# Patient Record
Sex: FEMALE | Race: WHITE | Employment: OTHER | URBAN - METROPOLITAN AREA
[De-identification: names, ages, dates, MRNs, and addresses within clinical notes are randomized per-mention and may not be internally consistent; named-entity substitution may affect disease eponyms.]

---

## 2017-12-19 ENCOUNTER — HOSPITAL ENCOUNTER (OUTPATIENT)
Dept: LAB | Age: 77
Discharge: HOME | End: 2017-12-19
Payer: MEDICARE

## 2017-12-19 LAB
APPEARANCE FLD: NORMAL
APPEARANCE FLD: NORMAL
BASOPHILS # BLD: 0 K/UL (ref 0–0.2)
BASOPHILS NFR BLD: 0 % (ref 0–2)
COLOR FLD: NORMAL
COLOR FLD: YELLOW
CRP SERPL-MCNC: 1.8 MG/DL (ref 0–0.9)
DIFFERENTIAL METHOD BLD: ABNORMAL
EOSINOPHIL # BLD: 0.2 K/UL (ref 0–0.8)
EOSINOPHIL NFR BLD: 2 % (ref 0.5–7.8)
ERYTHROCYTE [DISTWIDTH] IN BLOOD BY AUTOMATED COUNT: 13.1 % (ref 11.9–14.6)
ERYTHROCYTE [SEDIMENTATION RATE] IN BLOOD: 26 MM/HR (ref 0–30)
HCT VFR BLD AUTO: 45.6 % (ref 35.8–46.3)
HGB BLD-MCNC: 14.6 G/DL (ref 11.7–15.4)
IMM GRANULOCYTES # BLD: 0 K/UL (ref 0–0.5)
IMM GRANULOCYTES NFR BLD AUTO: 0 % (ref 0–5)
LYMPHOCYTES # BLD: 1.8 K/UL (ref 0.5–4.6)
LYMPHOCYTES NFR BLD: 21 % (ref 13–44)
LYMPHOCYTES NFR FLD: 21 %
LYMPHOCYTES NFR FLD: 4 %
MCH RBC QN AUTO: 29.9 PG (ref 26.1–32.9)
MCHC RBC AUTO-ENTMCNC: 32 G/DL (ref 31.4–35)
MCV RBC AUTO: 93.3 FL (ref 79.6–97.8)
MONOCYTES # BLD: 0.9 K/UL (ref 0.1–1.3)
MONOCYTES NFR BLD: 10 % (ref 4–12)
MONOS+MACROS NFR FLD: 2 %
MONOS+MACROS NFR FLD: 8 %
NEUTROPHILS NFR FLD: 71 %
NEUTROPHILS NFR FLD: 94 %
NEUTS SEG # BLD: 5.6 K/UL (ref 1.7–8.2)
NEUTS SEG NFR BLD: 67 % (ref 43–78)
NUC CELL # FLD: 1598 /CU MM
NUC CELL # FLD: 8523 /CU MM
PLATELET # BLD AUTO: 327 K/UL (ref 150–450)
PMV BLD AUTO: 9.8 FL (ref 10.8–14.1)
RBC # BLD AUTO: 4.89 M/UL (ref 4.05–5.25)
RBC # FLD: NORMAL /CU MM
RBC # FLD: NORMAL /CU MM
SPECIMEN SOURCE FLD: NORMAL
SPECIMEN SOURCE FLD: NORMAL
URATE SERPL-MCNC: 6.2 MG/DL (ref 2.6–6)
WBC # BLD AUTO: 8.6 K/UL (ref 4.3–11.1)

## 2017-12-19 PROCEDURE — 85652 RBC SED RATE AUTOMATED: CPT | Performed by: ORTHOPAEDIC SURGERY

## 2017-12-19 PROCEDURE — 84550 ASSAY OF BLOOD/URIC ACID: CPT | Performed by: ORTHOPAEDIC SURGERY

## 2017-12-19 PROCEDURE — 85025 COMPLETE CBC W/AUTO DIFF WBC: CPT | Performed by: ORTHOPAEDIC SURGERY

## 2017-12-19 PROCEDURE — 36415 COLL VENOUS BLD VENIPUNCTURE: CPT | Performed by: ORTHOPAEDIC SURGERY

## 2017-12-19 PROCEDURE — 87205 SMEAR GRAM STAIN: CPT | Performed by: ORTHOPAEDIC SURGERY

## 2017-12-19 PROCEDURE — 87075 CULTR BACTERIA EXCEPT BLOOD: CPT | Performed by: ORTHOPAEDIC SURGERY

## 2017-12-19 PROCEDURE — 89060 EXAM SYNOVIAL FLUID CRYSTALS: CPT | Performed by: ORTHOPAEDIC SURGERY

## 2017-12-19 PROCEDURE — 86140 C-REACTIVE PROTEIN: CPT | Performed by: ORTHOPAEDIC SURGERY

## 2017-12-19 PROCEDURE — 89050 BODY FLUID CELL COUNT: CPT | Performed by: ORTHOPAEDIC SURGERY

## 2017-12-21 LAB
BACTERIA SPEC CULT: NORMAL
BACTERIA SPEC CULT: NORMAL
BODY FLD TYPE: NORMAL
BODY FLD TYPE: NORMAL
CRYSTALS FLD MICRO: NORMAL
CRYSTALS FLD MICRO: NORMAL
GRAM STN SPEC: NORMAL
SERVICE CMNT-IMP: NORMAL
SERVICE CMNT-IMP: NORMAL

## 2017-12-26 LAB
BACTERIA SPEC CULT: NORMAL
BACTERIA SPEC CULT: NORMAL
SERVICE CMNT-IMP: NORMAL
SERVICE CMNT-IMP: NORMAL

## 2018-01-16 ENCOUNTER — HOSPITAL ENCOUNTER (OUTPATIENT)
Dept: LAB | Age: 78
Discharge: HOME | End: 2018-01-16
Payer: MEDICARE

## 2018-01-16 LAB
APPEARANCE FLD: NORMAL
APPEARANCE FLD: NORMAL
BASOPHILS NFR FLD: 2 %
COLOR FLD: NORMAL
COLOR FLD: NORMAL
EOSINOPHIL NFR FLD MANUAL: 1 %
FLUID COMMENTS, FCOM: NORMAL
LYMPHOCYTES NFR FLD: 21 %
LYMPHOCYTES NFR FLD: 23 %
NEUTROPHILS NFR FLD: 76 %
NEUTROPHILS NFR FLD: 77 %
NUC CELL # FLD: 1776 /CU MM
NUC CELL # FLD: NORMAL /CU MM
RBC # FLD: NORMAL /CU MM
RBC # FLD: NORMAL /CU MM
SPECIMEN SOURCE FLD: NORMAL
SPECIMEN SOURCE FLD: NORMAL

## 2018-01-16 PROCEDURE — 87205 SMEAR GRAM STAIN: CPT | Performed by: ORTHOPAEDIC SURGERY

## 2018-01-16 PROCEDURE — 87075 CULTR BACTERIA EXCEPT BLOOD: CPT | Performed by: ORTHOPAEDIC SURGERY

## 2018-01-16 PROCEDURE — 89050 BODY FLUID CELL COUNT: CPT | Performed by: ORTHOPAEDIC SURGERY

## 2018-01-16 PROCEDURE — 89060 EXAM SYNOVIAL FLUID CRYSTALS: CPT | Performed by: ORTHOPAEDIC SURGERY

## 2018-03-06 NOTE — H&P
[address] [facility]  Pre Operative History and Physical Exam    Patient ID:  Debbie Trent  [id number]  83 y.o.  1940    Today: March 6, 2018       Assessment:   1. Painful left knee        Plan:    1. Proceed with scheduled Procedure(s) (LRB):  LEFT KNEE ARTHROPLASTY TOTAL REVISION/DEPUY (Left)            CC: Left knee pain    HPI:   The patient has a painful left TKA. The patient was evaluated and examined during a consultation prior to this office visit. There have been no changes to the patient's orthopedic condition since the initial consultation. The necessity for joint replacement revision  is present. The patient will be admitted the day of surgery for Procedure(s) (LRB):  LEFT KNEE ARTHROPLASTY TOTAL REVISION/DEPUY (Left)      Past Medical/Surgical History:  Past Medical History:   Diagnosis Date    Arthritis     knee    Hypertension     controlled    Nausea & vomiting     Psychiatric disorder     depression, anxiety    Thyroid disease     hypo     Past Surgical History:   Procedure Laterality Date    HX MOHS PROCEDURES      right    THYROIDECTOMY          Allergies: Allergies   Allergen Reactions    Codeine Nausea and Vomiting        Physical Exam:   General: NAD, Alert, Oriented, Appears their stated age     [de-identified]: NC/AT, PERRL    Skin: No rashes, lesions or wounds seen      Psych: normal affect      Heart: Regular Rate, Rhythm     Lungs: unlabored respirations, normal breath sounds     Abdomen: Soft and non-distended     Ortho: Pain with limited ROM of the left knee    Neuro: no focal defects, sensation is equal bilaterally     Lymph: no lymphadenopathy     Meds:   No current facility-administered medications for this encounter. Current Outpatient Prescriptions   Medication Sig    warfarin (COUMADIN) 5 mg tablet Take 1 Tab by mouth nightly.  traMADol (ULTRAM) 50 mg tablet Take 1 Tab by mouth every six (6) hours as needed.     LORazepam (ATIVAN) 0.5 mg tablet Take 1 Tab by mouth every eight (8) hours as needed for Anxiety.  celecoxib (CELEBREX) 200 mg capsule Take 200 mg by mouth daily.  levothyroxine (SYNTHROID) 100 mcg tablet Take 100 mcg by mouth Daily (before breakfast).  lisinopril (PRINIVIL, ZESTRIL) 10 mg tablet Take 10 mg by mouth daily.  montelukast (SINGULAIR) 10 mg tablet Take 10 mg by mouth daily.  LORazepam (ATIVAN) 0.5 mg tablet Take 0.5 mg by mouth every four (4) hours as needed.  mometasone (NASONEX) 50 mcg/actuation nasal spray 2 Sprays daily. Labs:  Hospital Outpatient Visit on 01/16/2018   Component Date Value Ref Range Status    Special Requests: 01/16/2018     Final                    Value:LEFT  MEDIAL  SWAB      GRAM STAIN 01/16/2018 NO DEFINITE ORGANISM SEEN    Final    GRAM STAIN 01/16/2018 NO WBCS SEEN    Final    Culture result: 01/16/2018 NO GROWTH 2 DAYS    Final    Special Requests: 01/16/2018     Final                    Value:LEFT  MEDIAL  SWAB      Culture result: 01/16/2018 NO ANAEROBES ISOLATED 7 DAYS    Final    BODY FLUID TYPE 01/16/2018 KNEE FLUID    Final    Comment: LEFT  MEDIAL      FLUID COLOR 01/16/2018 BARBIE    Final    FLUID APPEARANCE 01/16/2018 TURBID    Final    FLUID RBC CT. 01/16/2018 62560  /cu mm Final    FLUID WBC COUNT 01/16/2018 1776  /cu mm Final    FLD NEUTROPHILS 01/16/2018 77  % Final    FLD LYMPHS 01/16/2018 21  % Final    FLD BASOS 01/16/2018 2  % Final    FLUID COMMENT 01/16/2018 FEW LARGE UNIDENTIFIED MONONUCLEAR CELLS SEEN   Final    FLUID TYPE(7) 01/16/2018 KNEE FLUID    Final    Comment: LEFT  MEDIAL      Crystals, body fluid 01/16/2018 (NOTE)   Final    Comment: SIGNIFICANT NUMBERS OF NEGATIVELY BIFRINGENT NEEDLE SHAPED CRYSTALS   ARE NOT IDENTIFIED UNDER POLARIZED LIGHT    PER DR. Bert Sheffield      Special Requests: 01/16/2018     Final                    Value:LEFT  JOINT  SWAB      GRAM STAIN 01/16/2018 NO DEFINITE ORGANISM SEEN    Final    GRAM STAIN 01/16/2018 0 TO 5 WBC'S PER OIF   Final    Culture result: 01/16/2018 NO GROWTH 2 DAYS    Final    Special Requests: 01/16/2018     Final                    Value:LEFT  JOINT  SWAB      Culture result: 01/16/2018 NO ANAEROBES ISOLATED 7 DAYS    Final    BODY FLUID TYPE 01/16/2018 KNEE FLUID    Final    Comment: LEFT  JOINT      FLUID COLOR 01/16/2018 RED    Final    FLUID APPEARANCE 01/16/2018 TURBID    Final    FLUID RBC CT. 01/16/2018 (NOTE)  /cu mm Final    QNS TO QUANTIFY. DIFFERENTIAL WILL BE PERFORMED.  FLUID WBC COUNT 01/16/2018 (NOTE)  /cu mm Final    QNS TO QUANTIFY. DIFFERENTIAL WILL BE PERFORMED.  FLD NEUTROPHILS 01/16/2018 76  % Final    FLD LYMPHS 01/16/2018 23  % Final    FLD EOSINS 01/16/2018 1  % Final    FLUID TYPE(7) 01/16/2018 KNEE FLUID    Final    Comment: LEFT  JOINT      Crystals, body fluid 01/16/2018 (NOTE)   Final    Comment: SIGNIFICANT NUMBER OF NEGATIVELY BIFRINGENT  NEEDLE SHAPED CRYSTALS   ARE NOT IDENTIFIED UNDER POLARIZED LIGHT     PER   [facility] Outpatient Visit on 12/19/2017   Component Date Value Ref Range Status    Special Requests: 12/19/2017     Final                    Value:LEFT  JOINT FLUID      GRAM STAIN 12/19/2017 0 TO 25 WBC'S/OIF   Final    GRAM STAIN 12/19/2017 NO DEFINITE ORGANISM SEEN    Final    Culture result: 12/19/2017 NO GROWTH 2 DAYS    Final    Special Requests: 12/19/2017     Final                    Value:LEFT  JOINT FLUID      Culture result: 12/19/2017 NO ANAEROBES ISOLATED 7 DAYS    Final    BODY FLUID TYPE 12/19/2017 KNEE    Final    Comment: LEFT  JOINT FLUID      FLUID COLOR 12/19/2017 RED    Final    FLUID APPEARANCE 12/19/2017 TURBID    Final    FLUID RBC CT. 12/19/2017 31400  /cu mm Final    FLUID WBC COUNT 12/19/2017 8523  /cu mm Final    FLD NEUTROPHILS 12/19/2017 94  % Final    FLD LYMPHS 12/19/2017 4  % Final    FLD MONO/MACROPHAGES 12/19/2017 2  % Final    FLUID TYPE(7) 12/19/2017 KNEE    Final Comment: LEFT  JOINT FLUID      Crystals, body fluid 12/19/2017 (NOTE)   Final    Comment: SIGNIFICANT NUMBERS OF NEGATIVELY BIREFRINGENT NEEDLE SHAPED CRYSTALS   ARE NOT IDENTIFIED UNDER POLARIZED LIGHT. DR Kaleigh Nina        Special Requests: 12/19/2017     Final                    Value:MEDIAL  LEFT     France Ruiz STAIN 12/19/2017 0 TO 6  WBC'S/OIF   Final    GRAM STAIN 12/19/2017 NO DEFINITE ORGANISM SEEN    Final    Culture result: 12/19/2017 NO GROWTH 2 DAYS    Final    Special Requests: 12/19/2017     Final                    Value:MEDIAL  LEFT      Culture result: 12/19/2017 NO ANAEROBES ISOLATED 7 DAYS    Final    BODY FLUID TYPE 12/19/2017 KNEE    Final    Comment: MEDIAL  LEFT      FLUID COLOR 12/19/2017 YELLOW    Final    FLUID APPEARANCE 12/19/2017 CLOUDY    Final    FLUID RBC CT. 12/19/2017 53533  /cu mm Final    FLUID WBC COUNT 12/19/2017 1598  /cu mm Final    FLD NEUTROPHILS 12/19/2017 71  % Final    FLD LYMPHS 12/19/2017 21  % Final    FLD MONO/MACROPHAGES 12/19/2017 8  % Final    FLUID TYPE(7) 12/19/2017 KNEE    Final    Comment: MEDIAL  LEFT      Crystals, body fluid 12/19/2017 (NOTE)   Final    Comment: BLOODY, SIGNIFICANT NUMBERS OF NEGATIVELY BIREFRIGENT NEEDLE-SHAPED   CRYSTALS ARE NOT IDENTIFIED UNDER POLARIZED LIGHT.     [address] Outpatient Visit on 12/19/2017   Component Date Value Ref Range Status    WBC 12/19/2017 8.6  4.3 - 11.1 K/uL Final    RBC 12/19/2017 4.89  4.05 - 5.25 M/uL Final    HGB 12/19/2017 14.6  11.7 - 15.4 g/dL Final    HCT 12/19/2017 45.6  35.8 - 46.3 % Final    MCV 12/19/2017 93.3  79.6 - 97.8 FL Final    MCH 12/19/2017 29.9  26.1 - 32.9 PG Final    MCHC 12/19/2017 32.0  31.4 - 35.0 g/dL Final    RDW 12/19/2017 13.1  11.9 - 14.6 % Final    PLATELET 16/69/3851 025  150 - 450 K/uL Final    MPV 12/19/2017 9.8* 10.8 - 14.1 FL Final    DF 12/19/2017 AUTOMATED    Final    NEUTROPHILS 12/19/2017 67  43 - 78 % Final    LYMPHOCYTES 12/19/2017 21  13 - 44 % Final    MONOCYTES 12/19/2017 10  4.0 - 12.0 % Final    EOSINOPHILS 12/19/2017 2  0.5 - 7.8 % Final    BASOPHILS 12/19/2017 0  0.0 - 2.0 % Final    IMMATURE GRANULOCYTES 12/19/2017 0  0.0 - 5.0 % Final    ABS. NEUTROPHILS 12/19/2017 5.6  1.7 - 8.2 K/UL Final    ABS. LYMPHOCYTES 12/19/2017 1.8  0.5 - 4.6 K/UL Final    ABS. MONOCYTES 12/19/2017 0.9  0.1 - 1.3 K/UL Final    ABS. EOSINOPHILS 12/19/2017 0.2  0.0 - 0.8 K/UL Final    ABS. BASOPHILS 12/19/2017 0.0  0.0 - 0.2 K/UL Final    ABS. IMM. GRANS.  12/19/2017 0.0  0.0 - 0.5 K/UL Final    C-Reactive protein 12/19/2017 1.8* 0.0 - 0.9 mg/dL Final    Sed rate, automated 12/19/2017 26  0 - 30 mm/hr Final    Uric acid 12/19/2017 6.2* 2.6 - 6.0 MG/DL Final                 Patient Active Problem List   Diagnosis Code    Hypertension I10    Anxiety F41.9    Depression F32.9    Hypothyroidism E03.9    DJD (degenerative joint disease) of knee M17.10         Signed By: KAMALJIT Ann  March 6, 2018

## 2018-03-12 ENCOUNTER — HOSPITAL ENCOUNTER (OUTPATIENT)
Dept: ULTRASOUND IMAGING | Age: 78
Discharge: HOME | End: 2018-03-12
Attending: ORTHOPAEDIC SURGERY
Payer: MEDICARE

## 2018-03-12 DIAGNOSIS — M79.89 LEFT LEG SWELLING: ICD-10-CM

## 2018-03-12 PROCEDURE — 93971 EXTREMITY STUDY: CPT

## 2018-03-19 ENCOUNTER — HOSPITAL ENCOUNTER (OUTPATIENT)
Dept: PHYSICAL THERAPY | Age: 78
Discharge: HOME | End: 2018-03-19
Payer: MEDICARE

## 2018-03-19 ENCOUNTER — HOSPITAL ENCOUNTER (OUTPATIENT)
Dept: SURGERY | Age: 78
Discharge: HOME | End: 2018-03-19
Payer: MEDICARE

## 2018-03-19 VITALS
HEIGHT: 68 IN | TEMPERATURE: 96.3 F | DIASTOLIC BLOOD PRESSURE: 111 MMHG | BODY MASS INDEX: 29.18 KG/M2 | WEIGHT: 192.5 LBS | HEART RATE: 62 BPM | RESPIRATION RATE: 16 BRPM | OXYGEN SATURATION: 97 % | SYSTOLIC BLOOD PRESSURE: 198 MMHG

## 2018-03-19 PROBLEM — R03.0 ELEVATED BLOOD PRESSURE READING: Status: ACTIVE | Noted: 2018-03-19

## 2018-03-19 LAB
ANION GAP SERPL CALC-SCNC: 8 MMOL/L (ref 7–16)
APPEARANCE UR: ABNORMAL
APTT PPP: 37.4 SEC (ref 23.2–35.3)
ATRIAL RATE: 60 BPM
BACTERIA SPEC CULT: NORMAL
BACTERIA URNS QL MICRO: 0 /HPF
BASOPHILS # BLD: 0.1 K/UL (ref 0–0.2)
BASOPHILS NFR BLD: 1 % (ref 0–2)
BILIRUB UR QL: NEGATIVE
BUN SERPL-MCNC: 22 MG/DL (ref 8–23)
CALCIUM SERPL-MCNC: 9.1 MG/DL (ref 8.3–10.4)
CALCULATED P AXIS, ECG09: 36 DEGREES
CALCULATED R AXIS, ECG10: -99 DEGREES
CALCULATED T AXIS, ECG11: 21 DEGREES
CHLORIDE SERPL-SCNC: 107 MMOL/L (ref 98–107)
CO2 SERPL-SCNC: 31 MMOL/L (ref 21–32)
COLOR UR: ABNORMAL
CREAT SERPL-MCNC: 1.02 MG/DL (ref 0.6–1)
DIAGNOSIS, 93000: NORMAL
DIFFERENTIAL METHOD BLD: ABNORMAL
EOSINOPHIL # BLD: 0.2 K/UL (ref 0–0.8)
EOSINOPHIL NFR BLD: 2 % (ref 0.5–7.8)
EPI CELLS #/AREA URNS HPF: ABNORMAL /HPF
ERYTHROCYTE [DISTWIDTH] IN BLOOD BY AUTOMATED COUNT: 14 % (ref 11.9–14.6)
GLUCOSE SERPL-MCNC: 108 MG/DL (ref 65–100)
GLUCOSE UR STRIP.AUTO-MCNC: NEGATIVE MG/DL
HCT VFR BLD AUTO: 45 % (ref 35.8–46.3)
HGB BLD-MCNC: 14.2 G/DL (ref 11.7–15.4)
HGB UR QL STRIP: NEGATIVE
IMM GRANULOCYTES # BLD: 0 K/UL (ref 0–0.5)
IMM GRANULOCYTES NFR BLD AUTO: 0 % (ref 0–5)
INR PPP: 1
KETONES UR QL STRIP.AUTO: NEGATIVE MG/DL
LEUKOCYTE ESTERASE UR QL STRIP.AUTO: ABNORMAL
LYMPHOCYTES # BLD: 1.9 K/UL (ref 0.5–4.6)
LYMPHOCYTES NFR BLD: 25 % (ref 13–44)
MCH RBC QN AUTO: 29.6 PG (ref 26.1–32.9)
MCHC RBC AUTO-ENTMCNC: 31.6 G/DL (ref 31.4–35)
MCV RBC AUTO: 93.9 FL (ref 79.6–97.8)
MONOCYTES # BLD: 0.7 K/UL (ref 0.1–1.3)
MONOCYTES NFR BLD: 9 % (ref 4–12)
NEUTS SEG # BLD: 5 K/UL (ref 1.7–8.2)
NEUTS SEG NFR BLD: 63 % (ref 43–78)
NITRITE UR QL STRIP.AUTO: NEGATIVE
P-R INTERVAL, ECG05: 124 MS
PH UR STRIP: 5.5 [PH] (ref 5–9)
PLATELET # BLD AUTO: 308 K/UL (ref 150–450)
PMV BLD AUTO: 9.9 FL (ref 10.8–14.1)
POTASSIUM SERPL-SCNC: 4.3 MMOL/L (ref 3.5–5.1)
PROT UR STRIP-MCNC: NEGATIVE MG/DL
PROTHROMBIN TIME: 13 SEC (ref 11.5–14.5)
Q-T INTERVAL, ECG07: 432 MS
QRS DURATION, ECG06: 134 MS
QTC CALCULATION (BEZET), ECG08: 432 MS
RBC # BLD AUTO: 4.79 M/UL (ref 4.05–5.25)
RBC #/AREA URNS HPF: ABNORMAL /HPF
SERVICE CMNT-IMP: NORMAL
SODIUM SERPL-SCNC: 146 MMOL/L (ref 136–145)
SP GR UR REFRACTOMETRY: 1.02 (ref 1–1.02)
UROBILINOGEN UR QL STRIP.AUTO: 1 EU/DL (ref 0.2–1)
VENTRICULAR RATE, ECG03: 60 BPM
WBC # BLD AUTO: 7.9 K/UL (ref 4.3–11.1)
WBC URNS QL MICRO: ABNORMAL /HPF

## 2018-03-19 PROCEDURE — 36415 COLL VENOUS BLD VENIPUNCTURE: CPT | Performed by: PHYSICIAN ASSISTANT

## 2018-03-19 PROCEDURE — 85025 COMPLETE CBC W/AUTO DIFF WBC: CPT | Performed by: PHYSICIAN ASSISTANT

## 2018-03-19 PROCEDURE — G8978 MOBILITY CURRENT STATUS: HCPCS

## 2018-03-19 PROCEDURE — 85610 PROTHROMBIN TIME: CPT | Performed by: PHYSICIAN ASSISTANT

## 2018-03-19 PROCEDURE — G8979 MOBILITY GOAL STATUS: HCPCS

## 2018-03-19 PROCEDURE — 97161 PT EVAL LOW COMPLEX 20 MIN: CPT

## 2018-03-19 PROCEDURE — 77030027138 HC INCENT SPIROMETER -A

## 2018-03-19 PROCEDURE — 81001 URINALYSIS AUTO W/SCOPE: CPT | Performed by: PHYSICIAN ASSISTANT

## 2018-03-19 PROCEDURE — G8980 MOBILITY D/C STATUS: HCPCS

## 2018-03-19 PROCEDURE — 93005 ELECTROCARDIOGRAM TRACING: CPT | Performed by: ANESTHESIOLOGY

## 2018-03-19 PROCEDURE — 87641 MR-STAPH DNA AMP PROBE: CPT | Performed by: PHYSICIAN ASSISTANT

## 2018-03-19 PROCEDURE — 80048 BASIC METABOLIC PNL TOTAL CA: CPT | Performed by: PHYSICIAN ASSISTANT

## 2018-03-19 PROCEDURE — 85730 THROMBOPLASTIN TIME PARTIAL: CPT | Performed by: PHYSICIAN ASSISTANT

## 2018-03-19 RX ORDER — LEVOTHYROXINE SODIUM 88 UG/1
88 TABLET ORAL
COMMUNITY

## 2018-03-19 RX ORDER — BISMUTH SUBSALICYLATE 262 MG
1 TABLET,CHEWABLE ORAL DAILY
COMMUNITY
End: 2019-05-30

## 2018-03-19 RX ORDER — FLUTICASONE PROPIONATE 50 MCG
2 SPRAY, SUSPENSION (ML) NASAL
COMMUNITY

## 2018-03-19 NOTE — PROGRESS NOTES
Theresa Saravia  : [birth year](79 y.o.) [facility]  [address], [address]  [phone]       Physical Therapy Prehab Plan of Treatment and Evaluation Summary:3/19/2018    ICD-10: Treatment Diagnosis:   · Pain in Left Knee (M25.562)  · Stiffness of Left Knee, Not elsewhere classified (M25.662)  · Difficulty in walking, Not elsewhere classified (R26.2)  Precautions/Allergies:   Codeine and Other medication  MEDICAL/REFERRING DIAGNOSIS:  Presence of left artificial knee joint [Z96.652]  REFERRING PHYSICIAN: Jolly Garcia, *  DATE OF SURGERY: 18   Assessment:   Comments:  Pt. Had a left tka in  so this will be a revision. She reports back pain and right knee pain that is worst than her left knee. She has a significant amount of medial knee swelling. She has a knee sleeve on her right knee. She plans to go home with grand daughter for support. PROBLEM LIST (Impacting functional limitations):  Ms. Orin Lovelace presents with the following left lower extremity(s) problems:  1. Strength  2. Range of Motion  3. Home Exercise Program  4. Pain   INTERVENTIONS PLANNED:  1. Home Exercise Program  2. Educational Discussion     TREATMENT PLAN: Effective Dates: 3/19/2018 TO 3/19/2018. Frequency/Duration: Patient to continue to perform home exercise program at least twice per day up until her surgery. GOALS: (Goals have been discussed and agreed upon with patient.)  Discharge Goals: Time Frame: 1 Day  1. Patient will demonstrate independence with a home exercise program designed to increase strength, range of motion and pain control to minimize functional deficits and optimize patient for total joint replacement. Rehabilitation Potential For Stated Goals: Good  Regarding Theresa Saravia therapy, I certify that the treatment plan above will be carried out by a therapist or under their direction.   Thank you for this referral,  María Hill, PT               HISTORY:   Present Symptoms:  Pain Intensity 1:  (4 at worst)  Pain Location 1: Knee   History of Present Injury/Illness (Reason for Referral):  Medical/Referring Diagnosis: Presence of left artificial knee joint [Z96.652]   Past Medical History/Comorbidities:   Ms. Clara Prajapati  has a past medical history of Arthritis; Hypertension; Nausea & vomiting; Psychiatric disorder; Pulmonary emboli ([address] Thyroid disease; and Urinary incontinence. Ms. Clara Prajapati  has a past surgical history that includes hx mohs procedure (Right); pr thyroidectomy; and hx knee replacement (Left, 2012).   Social History/Living Environment:   Home Environment: Private residence  # Steps to Enter: 4  Rails to Enter: No  One/Two Story Residence: Split level  # of Interior Steps: 5  Interior Rails: Left  Lift Chair Available: No  Living Alone: No  Support Systems: Family member(s)  Patient Expects to be Discharged to[de-identified] Private residence  Current DME Used/Available at Home: [employer], rollator  Tub or Shower Type: Shower  Work/Activity:  retired  Dominant Side:  RIGHT  Current Medications:  See [address] note   Number of Personal Factors/Comorbidities that affect the Plan of Care: 3+: HIGH COMPLEXITY   EXAMINATION:   ADLs (Current Functional Status):   Ambulation:  [] Independent  [] Walk Indoors Only  [] Walk Outdoors  [x] Use Assistive Device  [] Use Wheelchair Only Dressing:  [x] Independent  Requires Assistance from Someone for:  [] Sock/Shoes  [] Pants  [] Everything   Bathing/Showering:   [x] Independent  [] Requires Assistance from Someone  [] [address]:  [] Routine house and yard work  [x] Light Housework Only  [] None   Observation/Orthostatic Postural Assessment:   Exceptions to WDLGenu valgus left, Genu valgus right  ROM/Flexibility:   Gross Assessment: Yes  AROM: Generally decreased, functional (right LE)                LLE Assessment  LLE Assessment (WDL): Exception to WDL  LLE AROM  L Knee Flexion: 98  L Knee Extension: 0          Strength:   Gross Assessment: Yes  Strength: Generally decreased, functional (right LE)       LLE Strength  L Knee Flexion: 4  L Knee Extension: 4          Functional Mobility:    Gross Assessment: Yes    Gait Description (WDL): Exceptions to WDL  Stand to Sit: Modified independent, Additional time  Sit to Stand: Additional time, Modified independent  Distance (ft): 250 Feet (ft)  Ambulation - Level of Assistance: Modified independent; Additional time  Assistive Device: Walker, rollator  Speed/Vibha: Delayed  Step Length: Left shortened;Right shortened  Stance: Left decreased  Gait Abnormalities: Antalgic          Balance:    Sitting: Intact  Standing: With support   Body Structures Involved:  1. Bones  2. Joints  3. Muscles  4. Ligaments Body Functions Affected:  1. Movement Related Activities and Participation Affected:  1. Mobility   Number of elements that affect the Plan of Care: 1-2: LOW COMPLEXITY   CLINICAL PRESENTATION:   Presentation: Stable and uncomplicated: LOW COMPLEXITY   CLINICAL DECISION MAKING:   Outcome Measure: Tool Used: Lower Extremity Functional Scale (LEFS)  Score:  Initial: 33/80 Most Recent: X/80 (Date: -- )   Interpretation of Score: 20 questions each scored on a 5 point scale with 0 representing \"extreme difficulty or unable to perform\" and 4 representing \"no difficulty\". The lower the score, the greater the functional disability. 80/80 represents no disability. Minimal detectable change is 9 points. Score 80 79-65 64-49 48-33 32-17 16-1 0   Modifier CH CI CJ CK CL CM CN     ?  Mobility - Walking and Moving Around:     - CURRENT STATUS: CK - 40%-59% impaired, limited or restricted    - GOAL STATUS: CK - 40%-59% impaired, limited or restricted    - D/C STATUS:  CK - 40%-59% impaired, limited or restricted  Medical Necessity:   · Ms. Jami Iglesias is expected to optimize her lower extremity strength and ROM in preparation for joint replacement surgery. Reason for Services/Other Comments:  · Achieve baseline assesment of musculoskeletal system, functional mobility and home environment. , educate in PT HEP in preparation for surgery, educate in hospital plan of care. Use of outcome tool(s) and clinical judgement create a POC that gives a: Clear prediction of patient's progress: LOW COMPLEXITY   TREATMENT:   Treatment/Session Assessment:  Patient was instructed in PT- HEP to increase strength and ROM in LEs. Answered all questions. · Post session pain:  No complaints  · Compliance with Program/Exercises: compliant most of the time.   Total Treatment Duration:  PT Patient Time In/Time Out  Time In: 1140  Time Out: 15074 Carey Quresih

## 2018-03-19 NOTE — PERIOP NOTES
PTT and sodium levels elevated- will have anesthesia review. All other lab results within anesthesia guidelines. Lab results sent to PCP per surgeon's request.     Recent Results (from the past 12 hour(s))   CBC WITH AUTOMATED DIFF    Collection Time: 03/19/18 12:35 PM   Result Value Ref Range    WBC 7.9 4.3 - 11.1 K/uL    RBC 4.79 4.05 - 5.25 M/uL    HGB 14.2 11.7 - 15.4 g/dL    HCT 45.0 35.8 - 46.3 %    MCV 93.9 79.6 - 97.8 FL    MCH 29.6 26.1 - 32.9 PG    MCHC 31.6 31.4 - 35.0 g/dL    RDW 14.0 11.9 - 14.6 %    PLATELET 753 668 - 576 K/uL    MPV 9.9 (L) 10.8 - 14.1 FL    DF AUTOMATED      NEUTROPHILS 63 43 - 78 %    LYMPHOCYTES 25 13 - 44 %    MONOCYTES 9 4.0 - 12.0 %    EOSINOPHILS 2 0.5 - 7.8 %    BASOPHILS 1 0.0 - 2.0 %    IMMATURE GRANULOCYTES 0 0.0 - 5.0 %    ABS. NEUTROPHILS 5.0 1.7 - 8.2 K/UL    ABS. LYMPHOCYTES 1.9 0.5 - 4.6 K/UL    ABS. MONOCYTES 0.7 0.1 - 1.3 K/UL    ABS. EOSINOPHILS 0.2 0.0 - 0.8 K/UL    ABS. BASOPHILS 0.1 0.0 - 0.2 K/UL    ABS. IMM.  GRANS. 0.0 0.0 - 0.5 K/UL   PROTHROMBIN TIME + INR    Collection Time: 03/19/18 12:35 PM   Result Value Ref Range    Prothrombin time 13.0 11.5 - 14.5 sec    INR 1.0     PTT    Collection Time: 03/19/18 12:35 PM   Result Value Ref Range    aPTT 37.4 (H) 23.2 - 34.3 SEC   METABOLIC PANEL, BASIC    Collection Time: 03/19/18 12:35 PM   Result Value Ref Range    Sodium 146 (H) 136 - 145 mmol/L    Potassium 4.3 3.5 - 5.1 mmol/L    Chloride 107 98 - 107 mmol/L    CO2 31 21 - 32 mmol/L    Anion gap 8 7 - 16 mmol/L    Glucose 108 (H) 65 - 100 mg/dL    BUN 22 8 - 23 MG/DL    Creatinine 1.02 (H) 0.6 - 1.0 MG/DL    GFR est AA >60 >60 ml/min/1.73m2    GFR est non-AA 56 (L) >60 ml/min/1.73m2    Calcium 9.1 8.3 - 10.4 MG/DL   URINALYSIS W/ RFLX MICROSCOPIC    Collection Time: 03/19/18 12:35 PM   Result Value Ref Range    Color BARBIE      Appearance CLOUDY      Specific gravity 1.025 (H) 1.001 - 1.023      pH (UA) 5.5 5.0 - 9.0      Protein NEGATIVE  NEG mg/dL    Glucose NEGATIVE  mg/dL    Ketone NEGATIVE  NEG mg/dL    Bilirubin NEGATIVE  NEG      Blood NEGATIVE  NEG      Urobilinogen 1.0 0.2 - 1.0 EU/dL    Nitrites NEGATIVE  NEG      Leukocyte Esterase TRACE (A) NEG      WBC 0-3 0 /hpf    RBC 0-3 0 /hpf    Epithelial cells 3-5 0 /hpf    Bacteria 0 0 /hpf

## 2018-03-19 NOTE — PROGRESS NOTES
03/19/18 1200   Oxygen Therapy   O2 Sat (%) 98 %   Pulse via Oximetry 72 beats per minute   O2 Device Room air   Pre-Treatment   Breathing Pattern Regular   Breath Sounds Bilateral Clear   Pre FEV1 (liters) 1.8 liters   % Predicted 60   Incentive Spirometry Treatment   Actual Volume (ml) 1500 ml     Initial respiratory Assessment completed with pt. Pt was interviewed and evaluated in [facility] prior to surgery. Patient ID:  Sierra Feliciano  [id number]  6 y.o.  1940  Surgeon: Dr. Birgit Jorge  Date of Surgery: The linked surgery was not found. Please check manually. Procedure: Total Left Knee Arthroplasty  Primary Care Physician: Jadiel Pablo -[phone]  Specialists:                                  Pt instructed in the use of Incentive Spirometry. Pt instructed to bring Incentive Spirometer back on date of surgery & to start using Is upon return to pt room.     Pt taught proper cough technique    History of smoking:                                       FORMER                       Quit date:    1978            Past procedures with Oxygen desaturation: NONE    Past Medical History:   Diagnosis Date    Arthritis     knee    Hypertension     No meds in 15 yrs- Pt states has been controlled     Nausea & vomiting     Psychiatric disorder     depression, anxiety    Pulmonary emboli ([address]     Post op after TKA 2012    Thyroid disease     hypo    Urinary incontinence     Wears pads due to urgency                                     ENT:SEASONAL ALLERGIES                                                                                                                      Respiratory history:                                 SOB  ON EXERTION  - PT BELIEVES IT IS CONDITIONAL                                   Respiratory meds:  NONE                                       FAMILY PRESENT:                                                             NO                                        PAST SLEEP STUDY:                     NO  HX OF LEEROY:                                     NO                                     LEEROY assessment:                                               SLEEPS ON SIDE     AND        BACK                                                             PHYSICAL EXAM   There is no height or weight on file to calculate BMI.    Visit Vitals    SpO2 98%     Neck circumference:    30  cm    Loud snoring:        MINIMAL                                Witnessed apnea or wakening gasping or choking:,             DENIES,                                                                                                    Awakens with headaches:                                                  YES- WHEN SINUSES BOTHER PER PT    Morning or daytime tiredness/ sleepiness:                    DENIES                                                                                          Dry mouth or sore throat in morning:                YES                                                                              Sleepiness Scale:     Sitting and reading 1    Watching TV 2    Sitting inactive in a public place 0    As a passenger in a car for an hour without a break 1    Lying down to rest in the afternoon when circumstances Permits 2    Sitting and talking to someone 0    Sitting quietly after lunch without alcohol 1    In a car, while stopped for a few minutes 0    Total :  7      [place] stage:  3    SACS score: 1    STOP/BANG:                              CPAP:                       NONE                                                 CONT SAT HS            Pt. Phone Number:

## 2018-03-19 NOTE — PERIOP NOTES
Patient verified name, , and surgery as listed in [facility]. Type 3 surgery, PAT joint assessment complete. Labs per surgeon: cbc, bmp, UA, PT, PTT, MRSA nasal swab; results pending. Labs per anesthesia protocol: no additional labs needed. EKG:Done today- within anesthesia guidelines. B/P readings elevated x 2 at PAT visit. Informed Dr. Geovanna Flores and Joshua Mcnamara NP of elevated b/p readings. Dr. Geovanna Flores wants patient to be seen by PCP prior to surgery to have HTN addressed. Chart placed in problem charts for charge nurse follow up after patient sees PCP. Instructed pt that she would need to see PCP prior to surgery concerning high b/p readings and to let us know of any new medications. Hibiclens and instructions to return bottle on DOS given per hospital policy. Nasal Swab collected per MD order and instructions for Mupirocin nasal ointment if required. Patient provided with handouts including Guide to Surgery, Pain Management, Hand Hygiene, Blood Transfusion Education, and [facility] Brochure. Patient answered medical/surgical history questions at their best of ability. All prior to admission medications documented in [facility]. Original medication prescription bottle not visualized during patient appointment. Patient instructed to hold all vitamins 7 days prior to surgery and NSAIDS 5 days prior to surgery. Medications to be held: multivitamin. Patient instructed to continue previous medications as prescribed prior to surgery and to take the following medications the day of surgery according to anesthesia guidelines with a small sip of water: celebrex, synthroid, flonase if needed and ativan if needed. Patient teach back successful and patient demonstrates knowledge of instruction.

## 2018-03-19 NOTE — ADVANCED PRACTICE NURSE
Total Joint Surgery Preoperative Chart Review      Patient ID:  Nita Mc  [id number]  85 y.o.  1940  Surgeon: Dr. Teresita العلي  Date of Surgery: 4/11/2018  Procedure: Revision Total Left Knee Arthroplasty  Primary Care Physician: Lazara Salvador [phone]  Specialty Physician(s):      Subjective:   Nita Mc is a 66 y.o. WHITE OR  female who presents for preoperative evaluation for Revision Total Left Knee arthroplasty. This is a preoperative chart review note based on data collected by the nurse at the surgical Pre-Assessment visit. Past Medical History:   Diagnosis Date    Arthritis     knee    Hypertension     No meds in 15 yrs- Pt states has been controlled     Nausea & vomiting     Psychiatric disorder     depression, anxiety    Pulmonary emboli ([address]     Post op after TKA 2012    Thyroid disease     hypo    Urinary incontinence     Wears pads due to urgency       Past Surgical History:   Procedure Laterality Date    HX KNEE REPLACEMENT Left 2012    HX MOHS PROCEDURES Right     right rotator cuff repair     THYROIDECTOMY       Family History   Problem Relation Age of Onset    Cancer Mother     Heart Disease Father       Social History   Substance Use Topics    Smoking status: Former Smoker     Packs/day: 0.50     Years: 10.00     Quit date: 3/19/1978    Smokeless tobacco: Never Used    Alcohol use No       Prior to Admission medications    Medication Sig Start Date End Date Taking? Authorizing Provider   levothyroxine (SYNTHROID) 88 mcg tablet Take 88 mcg by mouth Daily (before breakfast). Yes Historical Provider   fluticasone (FLONASE ALLERGY RELIEF) 50 mcg/actuation nasal spray 2 Sprays by Both Nostrils route daily as needed for Rhinitis. Yes Historical Provider   multivitamin (ONE A DAY) tablet Take 1 Tab by mouth daily.    Yes Historical Provider   LORazepam (ATIVAN) 0.5 mg tablet Take 1 Tab by mouth every eight (8) hours as needed for Anxiety. 4/23/12  Yes Rizwan Laureano MD   celecoxib (CELEBREX) 200 mg capsule Take 200 mg by mouth daily. Yes Historical Provider   warfarin (COUMADIN) 5 mg tablet Take 1 Tab by mouth nightly. Patient not taking: Reported on 3/19/2018 4/23/12   Rizwan Laureano MD   traMADol Argelia Nations) 50 mg tablet Take 1 Tab by mouth every six (6) hours as needed. Patient not taking: Reported on 3/19/2018 4/23/12   Rizwan Laureano MD     Allergies   Allergen Reactions    Codeine Nausea and Vomiting    Other Medication Nausea and Vomiting     Pain meds and sedatives cause nausea and vomiting.            Objective:     Physical Exam:   Patient Vitals for the past 24 hrs:   Temp Pulse Resp BP SpO2   03/19/18 1408 - - - (!) 198/111 -   03/19/18 1328 96.3 °F (35.7 °C) 62 16 (!) 195/104 97 %       ECG:    EKG Results     Procedure 720 Value Units Date/Time    EKG, 12 LEAD, INITIAL [[id number]] Collected:  03/19/18 1304    Order Status:  Completed Updated:  03/19/18 1532     Ventricular Rate 60 BPM      Atrial Rate 60 BPM      P-R Interval 124 ms      QRS Duration 134 ms      Q-T Interval 432 ms      QTC Calculation (Bezet) 432 ms      Calculated P Axis 36 degrees      Calculated R Axis -99 degrees      Calculated T Axis 21 degrees      Diagnosis --     Normal sinus rhythm  Right bundle branch block  Abnormal ECG  No previous ECGs available  Confirmed by Antoine Daniels MD (), LAMIN PATRICIO ([id number]) on 3/19/2018 3:31:52 PM            Data Review:   Labs:   Recent Results (from the past 24 hour(s))   CBC WITH AUTOMATED DIFF    Collection Time: 03/19/18 12:35 PM   Result Value Ref Range    WBC 7.9 4.3 - 11.1 K/uL    RBC 4.79 4.05 - 5.25 M/uL    HGB 14.2 11.7 - 15.4 g/dL    HCT 45.0 35.8 - 46.3 %    MCV 93.9 79.6 - 97.8 FL    MCH 29.6 26.1 - 32.9 PG    MCHC 31.6 31.4 - 35.0 g/dL    RDW 14.0 11.9 - 14.6 %    PLATELET 033 607 - 636 K/uL    MPV 9.9 (L) 10.8 - 14.1 FL    DF AUTOMATED      NEUTROPHILS 63 43 - 78 %    LYMPHOCYTES 25 13 - 44 %    MONOCYTES 9 4.0 - 12.0 %    EOSINOPHILS 2 0.5 - 7.8 %    BASOPHILS 1 0.0 - 2.0 %    IMMATURE GRANULOCYTES 0 0.0 - 5.0 %    ABS. NEUTROPHILS 5.0 1.7 - 8.2 K/UL    ABS. LYMPHOCYTES 1.9 0.5 - 4.6 K/UL    ABS. MONOCYTES 0.7 0.1 - 1.3 K/UL    ABS. EOSINOPHILS 0.2 0.0 - 0.8 K/UL    ABS. BASOPHILS 0.1 0.0 - 0.2 K/UL    ABS. IMM.  GRANS. 0.0 0.0 - 0.5 K/UL   PROTHROMBIN TIME + INR    Collection Time: 03/19/18 12:35 PM   Result Value Ref Range    Prothrombin time 13.0 11.5 - 14.5 sec    INR 1.0     PTT    Collection Time: 03/19/18 12:35 PM   Result Value Ref Range    aPTT 37.4 (H) 23.2 - 13.3 SEC   METABOLIC PANEL, BASIC    Collection Time: 03/19/18 12:35 PM   Result Value Ref Range    Sodium 146 (H) 136 - 145 mmol/L    Potassium 4.3 3.5 - 5.1 mmol/L    Chloride 107 98 - 107 mmol/L    CO2 31 21 - 32 mmol/L    Anion gap 8 7 - 16 mmol/L    Glucose 108 (H) 65 - 100 mg/dL    BUN 22 8 - 23 MG/DL    Creatinine 1.02 (H) 0.6 - 1.0 MG/DL    GFR est AA >60 >60 ml/min/1.73m2    GFR est non-AA 56 (L) >60 ml/min/1.73m2    Calcium 9.1 8.3 - 10.4 MG/DL   URINALYSIS W/ RFLX MICROSCOPIC    Collection Time: 03/19/18 12:35 PM   Result Value Ref Range    Color BARBIE      Appearance CLOUDY      Specific gravity 1.025 (H) 1.001 - 1.023      pH (UA) 5.5 5.0 - 9.0      Protein NEGATIVE  NEG mg/dL    Glucose NEGATIVE  mg/dL    Ketone NEGATIVE  NEG mg/dL    Bilirubin NEGATIVE  NEG      Blood NEGATIVE  NEG      Urobilinogen 1.0 0.2 - 1.0 EU/dL    Nitrites NEGATIVE  NEG      Leukocyte Esterase TRACE (A) NEG      WBC 0-3 0 /hpf    RBC 0-3 0 /hpf    Epithelial cells 3-5 0 /hpf    Bacteria 0 0 /hpf   EKG, 12 LEAD, INITIAL    Collection Time: 03/19/18  1:04 PM   Result Value Ref Range    Ventricular Rate 60 BPM    Atrial Rate 60 BPM    P-R Interval 124 ms    QRS Duration 134 ms    Q-T Interval 432 ms    QTC Calculation (Bezet) 432 ms    Calculated P Axis 36 degrees    Calculated R Axis -99 degrees    Calculated T Axis 21 degrees    Diagnosis       Normal sinus rhythm  Right bundle branch block  Abnormal ECG  No previous ECGs available  Confirmed by Ronny Toscano MD (), LAMIN PATRICIO ([id number]) on 3/19/2018 3:31:52 PM           Problem List:  )  Patient Active Problem List   Diagnosis Code    Uncontrolled hypertension I10    Anxiety F41.9    Depression F32.9    Hypothyroidism E03.9    DJD (degenerative joint disease) of knee M17.10    Elevated blood pressure reading R03.0       Total Joint Surgery Pre-Assessment Recommendations:      Patient with multiple comorbidities including:  Advanced age of 66 with complicated TKA due to revision and uncontrolled hypertension at this time. She has elevated BP today and was previously on blood pressure medications. She is to follow up with PCP for optimal control. Patient would benefit from inpatient hospitalization with total knee surgery.               Signed By: INNA Tyler    March 19, 2018

## 2018-04-03 RX ORDER — LISINOPRIL 10 MG/1
10 TABLET ORAL DAILY
COMMUNITY
End: 2019-05-30

## 2018-04-03 NOTE — PERIOP NOTES
Most recent office note requested from Sergio Mina . Pt was instructed to f/u with PCP re: elevated BP at PAT appt.

## 2018-04-03 NOTE — PERIOP NOTES
Received office note dated (3/20/18) from PCP. Rx'd lisinopril 10 mg daily. Call to pt. States she is taking lisinopril in AM.  Med list updated and pt instructed to take every AM up to but NOT AM DOS. Pt verbalized understanding and teach back successful.

## 2018-04-10 ENCOUNTER — ANESTHESIA EVENT (OUTPATIENT)
Dept: SURGERY | Age: 78
DRG: 468 | End: 2018-04-10
Payer: MEDICARE

## 2018-04-10 RX ORDER — DIPHENHYDRAMINE HYDROCHLORIDE 50 MG/ML
12.5 INJECTION, SOLUTION INTRAMUSCULAR; INTRAVENOUS ONCE
Status: CANCELLED | OUTPATIENT
Start: 2018-04-10 | End: 2018-04-10

## 2018-04-10 RX ORDER — SODIUM CHLORIDE, SODIUM LACTATE, POTASSIUM CHLORIDE, CALCIUM CHLORIDE 600; 310; 30; 20 MG/100ML; MG/100ML; MG/100ML; MG/100ML
75 INJECTION, SOLUTION INTRAVENOUS CONTINUOUS
Status: CANCELLED | OUTPATIENT
Start: 2018-04-10

## 2018-04-10 RX ORDER — ACETAMINOPHEN 500 MG
500 TABLET ORAL ONCE
Status: CANCELLED | OUTPATIENT
Start: 2018-04-10 | End: 2018-04-10

## 2018-04-10 RX ORDER — OXYCODONE HYDROCHLORIDE 5 MG/1
10 TABLET ORAL
Status: CANCELLED | OUTPATIENT
Start: 2018-04-10

## 2018-04-10 RX ORDER — OXYCODONE HYDROCHLORIDE 5 MG/1
5 TABLET ORAL
Status: CANCELLED | OUTPATIENT
Start: 2018-04-10

## 2018-04-10 RX ORDER — SODIUM CHLORIDE 0.9 % (FLUSH) 0.9 %
5-10 SYRINGE (ML) INJECTION AS NEEDED
Status: CANCELLED | OUTPATIENT
Start: 2018-04-10

## 2018-04-10 RX ORDER — NALOXONE HYDROCHLORIDE 0.4 MG/ML
0.1 INJECTION, SOLUTION INTRAMUSCULAR; INTRAVENOUS; SUBCUTANEOUS AS NEEDED
Status: CANCELLED | OUTPATIENT
Start: 2018-04-10 | End: 2018-04-10

## 2018-04-10 RX ORDER — HYDROMORPHONE HYDROCHLORIDE 2 MG/ML
0.5 INJECTION, SOLUTION INTRAMUSCULAR; INTRAVENOUS; SUBCUTANEOUS
Status: CANCELLED | OUTPATIENT
Start: 2018-04-10

## 2018-04-10 RX ORDER — ONDANSETRON 2 MG/ML
4 INJECTION INTRAMUSCULAR; INTRAVENOUS ONCE
Status: CANCELLED | OUTPATIENT
Start: 2018-04-10 | End: 2018-04-10

## 2018-04-11 ENCOUNTER — ANESTHESIA (OUTPATIENT)
Dept: SURGERY | Age: 78
DRG: 468 | End: 2018-04-11
Payer: MEDICARE

## 2018-04-11 ENCOUNTER — APPOINTMENT (OUTPATIENT)
Dept: GENERAL RADIOLOGY | Age: 78
DRG: 468 | End: 2018-04-11
Attending: ORTHOPAEDIC SURGERY
Payer: MEDICARE

## 2018-04-11 ENCOUNTER — HOSPITAL ENCOUNTER (INPATIENT)
Age: 78
LOS: 2 days | Discharge: HOME HEALTH | DRG: 468 | End: 2018-04-13
Attending: ORTHOPAEDIC SURGERY | Admitting: ORTHOPAEDIC SURGERY
Payer: MEDICARE

## 2018-04-11 DIAGNOSIS — Z96.652 STATUS POST REVISION OF TOTAL KNEE REPLACEMENT, LEFT: Primary | ICD-10-CM

## 2018-04-11 LAB
ABO + RH BLD: NORMAL
APTT PPP: 32.7 SEC (ref 23.2–35.3)
BLOOD GROUP ANTIBODIES SERPL: NORMAL
GLUCOSE BLD STRIP.AUTO-MCNC: 85 MG/DL (ref 65–100)
HGB BLD-MCNC: 12.8 G/DL (ref 11.7–15.4)
SPECIMEN EXP DATE BLD: NORMAL

## 2018-04-11 PROCEDURE — 74011250637 HC RX REV CODE- 250/637: Performed by: PHYSICIAN ASSISTANT

## 2018-04-11 PROCEDURE — 88305 TISSUE EXAM BY PATHOLOGIST: CPT | Performed by: ORTHOPAEDIC SURGERY

## 2018-04-11 PROCEDURE — 77030031139 HC SUT VCRL2 J&J -A: Performed by: ORTHOPAEDIC SURGERY

## 2018-04-11 PROCEDURE — 74011250636 HC RX REV CODE- 250/636: Performed by: ANESTHESIOLOGY

## 2018-04-11 PROCEDURE — 77030003665 HC NDL SPN BBMI -A: Performed by: ANESTHESIOLOGY

## 2018-04-11 PROCEDURE — 77030006804 HC BLD SAW RECIP CNMD -B: Performed by: ORTHOPAEDIC SURGERY

## 2018-04-11 PROCEDURE — 77030019557 HC ELECTRD VES SEAL MEDT -F: Performed by: ORTHOPAEDIC SURGERY

## 2018-04-11 PROCEDURE — 88331 PATH CONSLTJ SURG 1 BLK 1SPC: CPT | Performed by: ORTHOPAEDIC SURGERY

## 2018-04-11 PROCEDURE — 76010010054 HC POST OP PAIN BLOCK: Performed by: ORTHOPAEDIC SURGERY

## 2018-04-11 PROCEDURE — 77030007880 HC KT SPN EPDRL BBMI -B: Performed by: ANESTHESIOLOGY

## 2018-04-11 PROCEDURE — C1776 JOINT DEVICE (IMPLANTABLE): HCPCS | Performed by: ORTHOPAEDIC SURGERY

## 2018-04-11 PROCEDURE — 94760 N-INVAS EAR/PLS OXIMETRY 1: CPT

## 2018-04-11 PROCEDURE — 77030020782 HC GWN BAIR PAWS FLX 3M -B: Performed by: ANESTHESIOLOGY

## 2018-04-11 PROCEDURE — 77030002912 HC SUT ETHBND J&J -A: Performed by: ORTHOPAEDIC SURGERY

## 2018-04-11 PROCEDURE — 74011000302 HC RX REV CODE- 302: Performed by: ORTHOPAEDIC SURGERY

## 2018-04-11 PROCEDURE — 77030018836 HC SOL IRR NACL ICUM -A: Performed by: ORTHOPAEDIC SURGERY

## 2018-04-11 PROCEDURE — 86580 TB INTRADERMAL TEST: CPT | Performed by: ORTHOPAEDIC SURGERY

## 2018-04-11 PROCEDURE — 85730 THROMBOPLASTIN TIME PARTIAL: CPT | Performed by: ORTHOPAEDIC SURGERY

## 2018-04-11 PROCEDURE — 65270000029 HC RM PRIVATE

## 2018-04-11 PROCEDURE — 85018 HEMOGLOBIN: CPT | Performed by: PHYSICIAN ASSISTANT

## 2018-04-11 PROCEDURE — 73560 X-RAY EXAM OF KNEE 1 OR 2: CPT

## 2018-04-11 PROCEDURE — 76010000173 HC OR TIME 3 TO 3.5 HR INTENSV-TIER 1: Performed by: ORTHOPAEDIC SURGERY

## 2018-04-11 PROCEDURE — 74011250636 HC RX REV CODE- 250/636

## 2018-04-11 PROCEDURE — C1713 ANCHOR/SCREW BN/BN,TIS/BN: HCPCS | Performed by: ORTHOPAEDIC SURGERY

## 2018-04-11 PROCEDURE — 77030011208: Performed by: ORTHOPAEDIC SURGERY

## 2018-04-11 PROCEDURE — 74011000250 HC RX REV CODE- 250: Performed by: ORTHOPAEDIC SURGERY

## 2018-04-11 PROCEDURE — 82962 GLUCOSE BLOOD TEST: CPT

## 2018-04-11 PROCEDURE — 74011250636 HC RX REV CODE- 250/636: Performed by: PHYSICIAN ASSISTANT

## 2018-04-11 PROCEDURE — 88304 TISSUE EXAM BY PATHOLOGIST: CPT | Performed by: ORTHOPAEDIC SURGERY

## 2018-04-11 PROCEDURE — 77030003602 HC NDL NRV BLK BBMI -B: Performed by: ANESTHESIOLOGY

## 2018-04-11 PROCEDURE — 77030008467 HC STPLR SKN COVD -B: Performed by: ORTHOPAEDIC SURGERY

## 2018-04-11 PROCEDURE — 74011250636 HC RX REV CODE- 250/636: Performed by: ORTHOPAEDIC SURGERY

## 2018-04-11 PROCEDURE — 76942 ECHO GUIDE FOR BIOPSY: CPT | Performed by: ORTHOPAEDIC SURGERY

## 2018-04-11 PROCEDURE — 94762 N-INVAS EAR/PLS OXIMTRY CONT: CPT

## 2018-04-11 PROCEDURE — 77010033678 HC OXYGEN DAILY

## 2018-04-11 PROCEDURE — 0SPD0JZ REMOVAL OF SYNTHETIC SUBSTITUTE FROM LEFT KNEE JOINT, OPEN APPROACH: ICD-10-PCS | Performed by: ORTHOPAEDIC SURGERY

## 2018-04-11 PROCEDURE — 87075 CULTR BACTERIA EXCEPT BLOOD: CPT | Performed by: ORTHOPAEDIC SURGERY

## 2018-04-11 PROCEDURE — 77030011640 HC PAD GRND REM COVD -A: Performed by: ORTHOPAEDIC SURGERY

## 2018-04-11 PROCEDURE — 76210000006 HC OR PH I REC 0.5 TO 1 HR: Performed by: ORTHOPAEDIC SURGERY

## 2018-04-11 PROCEDURE — 77030006790 HC BLD SAW OSC ZIMM -B: Performed by: ORTHOPAEDIC SURGERY

## 2018-04-11 PROCEDURE — 77030034849: Performed by: ORTHOPAEDIC SURGERY

## 2018-04-11 PROCEDURE — 74011000250 HC RX REV CODE- 250

## 2018-04-11 PROCEDURE — 77030012935 HC DRSG AQUACEL BMS -B: Performed by: ORTHOPAEDIC SURGERY

## 2018-04-11 PROCEDURE — 0SRD0J9 REPLACEMENT OF LEFT KNEE JOINT WITH SYNTHETIC SUBSTITUTE, CEMENTED, OPEN APPROACH: ICD-10-PCS | Performed by: ORTHOPAEDIC SURGERY

## 2018-04-11 PROCEDURE — 86900 BLOOD TYPING SEROLOGIC ABO: CPT | Performed by: PHYSICIAN ASSISTANT

## 2018-04-11 PROCEDURE — 77030006789 HC BLD SAW OSC STRY -C: Performed by: ORTHOPAEDIC SURGERY

## 2018-04-11 PROCEDURE — 77030002966 HC SUT PDS J&J -A: Performed by: ORTHOPAEDIC SURGERY

## 2018-04-11 PROCEDURE — 76060000037 HC ANESTHESIA 3 TO 3.5 HR: Performed by: ORTHOPAEDIC SURGERY

## 2018-04-11 PROCEDURE — 87205 SMEAR GRAM STAIN: CPT | Performed by: ORTHOPAEDIC SURGERY

## 2018-04-11 PROCEDURE — 77030032490 HC SLV COMPR SCD KNE COVD -B

## 2018-04-11 PROCEDURE — 77030035236 HC SUT PDS STRATFX BARB J&J -B: Performed by: ORTHOPAEDIC SURGERY

## 2018-04-11 PROCEDURE — 77030020753 HC CUF TRNQT 1BLA STRY -B: Performed by: ORTHOPAEDIC SURGERY

## 2018-04-11 PROCEDURE — 77030012406 HC DRN WND PENRS BARD -A: Performed by: ORTHOPAEDIC SURGERY

## 2018-04-11 DEVICE — ADAPTER FEM +2/-2MM OFFSET BOLT PFC SIG: Type: IMPLANTABLE DEVICE | Site: KNEE | Status: FUNCTIONAL

## 2018-04-11 DEVICE — ADAPTER FEM 5DEG KNEE PFC SIG: Type: IMPLANTABLE DEVICE | Site: KNEE | Status: FUNCTIONAL

## 2018-04-11 DEVICE — SLEEVE TIB H40MM AP27MM ML37MM MTPHSEAL TI PORCOAT POR REV: Type: IMPLANTABLE DEVICE | Site: KNEE | Status: FUNCTIONAL

## 2018-04-11 DEVICE — CEMENT BNE GENTAMC HV R+G 40GM -- PALACOS R+G 5036964: Type: IMPLANTABLE DEVICE | Site: KNEE | Status: FUNCTIONAL

## 2018-04-11 DEVICE — COMPONENT PAT DIA38MM DST POLY OVL DOME 3 PEG NP CEM MOD: Type: IMPLANTABLE DEVICE | Site: KNEE | Status: FUNCTIONAL

## 2018-04-11 DEVICE — IMPLANTABLE DEVICE: Type: IMPLANTABLE DEVICE | Site: KNEE | Status: FUNCTIONAL

## 2018-04-11 DEVICE — STIMULAN® RAPID CURE PROVIDED STERILE FOR SINGLE PATIENT USE. STIMULAN® RAPID CURE CONTAINS CALCIUM SULFATE POWDER AND MIXING SOLUTION IN PRE-MEASURED QUANTITIES SO THAT WHEN MIXED TOGETHER IN A STERILE MIXING BOWL, THE RESULTANT PASTE IS TO BE DIGITALLY PACKED INTO OPEN BONE VOID/GAP TO SET INSITU OR PLACED INTO THE MOULD PROVIDED, THE MIXTURE SETS TO FORM BEADS. THE BIODEGRADABLE, RADIOPAQUE BEADS ARE RESORBED IN APPROXIMATELY 30 – 60 DAYS WHEN USED IN ACCORDANCE WITH THE DEVICE LABELLING. STIMULAN® RAPID CURE IS MANUFACTURED FROM SYNTHETIC IMPLANT GRADE CALCIUM SULFATE DIHYDRATE(CASO4.2H2O) THAT RESORBS AND IS REPLACED WITH BONE DURING THE HEALING PROCESS. ALSO, AS THE BONE VOID FILLER BEADS ARE BIODEGRADABLE AND BIOCOMPATIBLE, THEY MAY BE USED AT AN INFECTED SITE.
Type: IMPLANTABLE DEVICE | Site: KNEE | Status: FUNCTIONAL
Brand: STIMULAN® RAPID CURE

## 2018-04-11 DEVICE — AUGMENT FEM SZ 3 THK4MM STD POST TI CEM PRI PFC SIG: Type: IMPLANTABLE DEVICE | Site: KNEE | Status: FUNCTIONAL

## 2018-04-11 DEVICE — TRAY TIB SZ 3 AP45.8MM ML69.6MM THK4.8MM CEM KEELED MOB: Type: IMPLANTABLE DEVICE | Site: KNEE | Status: FUNCTIONAL

## 2018-04-11 DEVICE — STEM FEM L75MM DIA18MM UNIV KNEE PRESSFIT FLUT FOR ROT HNG: Type: IMPLANTABLE DEVICE | Site: KNEE | Status: FUNCTIONAL

## 2018-04-11 DEVICE — SLEEVE FEM L34MM UNIV MTPHSEAL FULL POR LPS: Type: IMPLANTABLE DEVICE | Site: KNEE | Status: FUNCTIONAL

## 2018-04-11 DEVICE — COMPONENT FEM SZ 3 AP61MM ML66MM L CO CHROM NP PRI CEM: Type: IMPLANTABLE DEVICE | Site: KNEE | Status: FUNCTIONAL

## 2018-04-11 DEVICE — STEM FEM UNIV FLUT 75X16MM --: Type: IMPLANTABLE DEVICE | Site: KNEE | Status: FUNCTIONAL

## 2018-04-11 RX ORDER — LIDOCAINE HYDROCHLORIDE 10 MG/ML
0.1 INJECTION INFILTRATION; PERINEURAL AS NEEDED
Status: DISCONTINUED | OUTPATIENT
Start: 2018-04-11 | End: 2018-04-11 | Stop reason: HOSPADM

## 2018-04-11 RX ORDER — ASPIRIN 81 MG/1
81 TABLET ORAL EVERY 12 HOURS
Status: DISCONTINUED | OUTPATIENT
Start: 2018-04-11 | End: 2018-04-13 | Stop reason: HOSPADM

## 2018-04-11 RX ORDER — HYDROMORPHONE HYDROCHLORIDE 2 MG/1
2 TABLET ORAL
Status: DISCONTINUED | OUTPATIENT
Start: 2018-04-11 | End: 2018-04-13 | Stop reason: HOSPADM

## 2018-04-11 RX ORDER — SODIUM CHLORIDE 9 MG/ML
100 INJECTION, SOLUTION INTRAVENOUS CONTINUOUS
Status: DISPENSED | OUTPATIENT
Start: 2018-04-11 | End: 2018-04-13

## 2018-04-11 RX ORDER — CEFAZOLIN SODIUM/WATER 2 G/20 ML
2 SYRINGE (ML) INTRAVENOUS EVERY 8 HOURS
Status: COMPLETED | OUTPATIENT
Start: 2018-04-11 | End: 2018-04-12

## 2018-04-11 RX ORDER — HYDROMORPHONE HYDROCHLORIDE 2 MG/ML
1 INJECTION, SOLUTION INTRAMUSCULAR; INTRAVENOUS; SUBCUTANEOUS
Status: DISCONTINUED | OUTPATIENT
Start: 2018-04-11 | End: 2018-04-13 | Stop reason: HOSPADM

## 2018-04-11 RX ORDER — SODIUM CHLORIDE 0.9 % (FLUSH) 0.9 %
5-10 SYRINGE (ML) INJECTION AS NEEDED
Status: DISCONTINUED | OUTPATIENT
Start: 2018-04-11 | End: 2018-04-11 | Stop reason: HOSPADM

## 2018-04-11 RX ORDER — HYDROMORPHONE HYDROCHLORIDE 2 MG/1
2 TABLET ORAL
Qty: 40 TAB | Refills: 0 | Status: SHIPPED | OUTPATIENT
Start: 2018-04-11 | End: 2019-05-30

## 2018-04-11 RX ORDER — FENTANYL CITRATE 50 UG/ML
100 INJECTION, SOLUTION INTRAMUSCULAR; INTRAVENOUS AS NEEDED
Status: DISCONTINUED | OUTPATIENT
Start: 2018-04-11 | End: 2018-04-11 | Stop reason: HOSPADM

## 2018-04-11 RX ORDER — ROPIVACAINE HYDROCHLORIDE 5 MG/ML
INJECTION, SOLUTION EPIDURAL; INFILTRATION; PERINEURAL AS NEEDED
Status: DISCONTINUED | OUTPATIENT
Start: 2018-04-11 | End: 2018-04-11 | Stop reason: HOSPADM

## 2018-04-11 RX ORDER — SODIUM CHLORIDE 0.9 % (FLUSH) 0.9 %
5-10 SYRINGE (ML) INJECTION EVERY 8 HOURS
Status: DISCONTINUED | OUTPATIENT
Start: 2018-04-11 | End: 2018-04-11 | Stop reason: HOSPADM

## 2018-04-11 RX ORDER — PROPOFOL 10 MG/ML
INJECTION, EMULSION INTRAVENOUS
Status: DISCONTINUED | OUTPATIENT
Start: 2018-04-11 | End: 2018-04-11 | Stop reason: HOSPADM

## 2018-04-11 RX ORDER — VANCOMYCIN HYDROCHLORIDE 1 G/20ML
INJECTION, POWDER, LYOPHILIZED, FOR SOLUTION INTRAVENOUS AS NEEDED
Status: DISCONTINUED | OUTPATIENT
Start: 2018-04-11 | End: 2018-04-11 | Stop reason: HOSPADM

## 2018-04-11 RX ORDER — SODIUM CHLORIDE, SODIUM LACTATE, POTASSIUM CHLORIDE, CALCIUM CHLORIDE 600; 310; 30; 20 MG/100ML; MG/100ML; MG/100ML; MG/100ML
1000 INJECTION, SOLUTION INTRAVENOUS CONTINUOUS
Status: DISCONTINUED | OUTPATIENT
Start: 2018-04-11 | End: 2018-04-11 | Stop reason: HOSPADM

## 2018-04-11 RX ORDER — SODIUM CHLORIDE, SODIUM LACTATE, POTASSIUM CHLORIDE, CALCIUM CHLORIDE 600; 310; 30; 20 MG/100ML; MG/100ML; MG/100ML; MG/100ML
INJECTION, SOLUTION INTRAVENOUS
Status: DISCONTINUED | OUTPATIENT
Start: 2018-04-11 | End: 2018-04-11 | Stop reason: HOSPADM

## 2018-04-11 RX ORDER — NEOMYCIN AND POLYMYXIN B SULFATES 40; 200000 MG/ML; [USP'U]/ML
SOLUTION IRRIGATION AS NEEDED
Status: DISCONTINUED | OUTPATIENT
Start: 2018-04-11 | End: 2018-04-11 | Stop reason: HOSPADM

## 2018-04-11 RX ORDER — SODIUM CHLORIDE 0.9 % (FLUSH) 0.9 %
5-10 SYRINGE (ML) INJECTION AS NEEDED
Status: DISCONTINUED | OUTPATIENT
Start: 2018-04-11 | End: 2018-04-13 | Stop reason: HOSPADM

## 2018-04-11 RX ORDER — FLUTICASONE PROPIONATE 50 MCG
2 SPRAY, SUSPENSION (ML) NASAL
Status: DISCONTINUED | OUTPATIENT
Start: 2018-04-11 | End: 2018-04-13 | Stop reason: HOSPADM

## 2018-04-11 RX ORDER — TOBRAMYCIN 1.2 G/30ML
INJECTION, POWDER, LYOPHILIZED, FOR SOLUTION INTRAVENOUS AS NEEDED
Status: DISCONTINUED | OUTPATIENT
Start: 2018-04-11 | End: 2018-04-11 | Stop reason: HOSPADM

## 2018-04-11 RX ORDER — LEVOTHYROXINE SODIUM 88 UG/1
88 TABLET ORAL
Status: DISCONTINUED | OUTPATIENT
Start: 2018-04-12 | End: 2018-04-13 | Stop reason: HOSPADM

## 2018-04-11 RX ORDER — SCOLOPAMINE TRANSDERMAL SYSTEM 1 MG/1
1 PATCH, EXTENDED RELEASE TRANSDERMAL
Status: DISCONTINUED | OUTPATIENT
Start: 2018-04-11 | End: 2018-04-13 | Stop reason: HOSPADM

## 2018-04-11 RX ORDER — NALOXONE HYDROCHLORIDE 0.4 MG/ML
.2-.4 INJECTION, SOLUTION INTRAMUSCULAR; INTRAVENOUS; SUBCUTANEOUS
Status: DISCONTINUED | OUTPATIENT
Start: 2018-04-11 | End: 2018-04-13 | Stop reason: HOSPADM

## 2018-04-11 RX ORDER — ACETAMINOPHEN 500 MG
1000 TABLET ORAL EVERY 6 HOURS
Status: DISCONTINUED | OUTPATIENT
Start: 2018-04-12 | End: 2018-04-13 | Stop reason: HOSPADM

## 2018-04-11 RX ORDER — PROPOFOL 10 MG/ML
INJECTION, EMULSION INTRAVENOUS AS NEEDED
Status: DISCONTINUED | OUTPATIENT
Start: 2018-04-11 | End: 2018-04-11 | Stop reason: HOSPADM

## 2018-04-11 RX ORDER — EPHEDRINE SULFATE 50 MG/ML
INJECTION, SOLUTION INTRAVENOUS AS NEEDED
Status: DISCONTINUED | OUTPATIENT
Start: 2018-04-11 | End: 2018-04-11 | Stop reason: HOSPADM

## 2018-04-11 RX ORDER — APREPITANT 40 MG/1
80 CAPSULE ORAL DAILY
Status: COMPLETED | OUTPATIENT
Start: 2018-04-11 | End: 2018-04-11

## 2018-04-11 RX ORDER — ASPIRIN 81 MG/1
81 TABLET ORAL EVERY 12 HOURS
Qty: 60 TAB | Refills: 1 | Status: SHIPPED | OUTPATIENT
Start: 2018-04-11 | End: 2018-05-16

## 2018-04-11 RX ORDER — DEXAMETHASONE SODIUM PHOSPHATE 4 MG/ML
INJECTION, SOLUTION INTRA-ARTICULAR; INTRALESIONAL; INTRAMUSCULAR; INTRAVENOUS; SOFT TISSUE AS NEEDED
Status: DISCONTINUED | OUTPATIENT
Start: 2018-04-11 | End: 2018-04-11 | Stop reason: HOSPADM

## 2018-04-11 RX ORDER — CELECOXIB 200 MG/1
200 CAPSULE ORAL EVERY 12 HOURS
Status: DISCONTINUED | OUTPATIENT
Start: 2018-04-11 | End: 2018-04-13 | Stop reason: HOSPADM

## 2018-04-11 RX ORDER — LORAZEPAM 0.5 MG/1
0.5 TABLET ORAL
Status: DISCONTINUED | OUTPATIENT
Start: 2018-04-11 | End: 2018-04-13 | Stop reason: HOSPADM

## 2018-04-11 RX ORDER — MIDAZOLAM HYDROCHLORIDE 1 MG/ML
2 INJECTION, SOLUTION INTRAMUSCULAR; INTRAVENOUS
Status: DISCONTINUED | OUTPATIENT
Start: 2018-04-11 | End: 2018-04-11 | Stop reason: HOSPADM

## 2018-04-11 RX ORDER — KETOROLAC TROMETHAMINE 30 MG/ML
INJECTION, SOLUTION INTRAMUSCULAR; INTRAVENOUS AS NEEDED
Status: DISCONTINUED | OUTPATIENT
Start: 2018-04-11 | End: 2018-04-11 | Stop reason: HOSPADM

## 2018-04-11 RX ORDER — SODIUM CHLORIDE 0.9 % (FLUSH) 0.9 %
5-10 SYRINGE (ML) INJECTION EVERY 8 HOURS
Status: DISCONTINUED | OUTPATIENT
Start: 2018-04-11 | End: 2018-04-13 | Stop reason: HOSPADM

## 2018-04-11 RX ORDER — ONDANSETRON 2 MG/ML
4 INJECTION INTRAMUSCULAR; INTRAVENOUS
Status: DISCONTINUED | OUTPATIENT
Start: 2018-04-11 | End: 2018-04-13 | Stop reason: HOSPADM

## 2018-04-11 RX ORDER — ACETAMINOPHEN 10 MG/ML
1000 INJECTION, SOLUTION INTRAVENOUS ONCE
Status: COMPLETED | OUTPATIENT
Start: 2018-04-11 | End: 2018-04-11

## 2018-04-11 RX ORDER — LISINOPRIL 5 MG/1
10 TABLET ORAL DAILY
Status: DISCONTINUED | OUTPATIENT
Start: 2018-04-12 | End: 2018-04-13 | Stop reason: HOSPADM

## 2018-04-11 RX ORDER — TRANEXAMIC ACID 100 MG/ML
INJECTION, SOLUTION INTRAVENOUS AS NEEDED
Status: DISCONTINUED | OUTPATIENT
Start: 2018-04-11 | End: 2018-04-11 | Stop reason: HOSPADM

## 2018-04-11 RX ORDER — ONDANSETRON 2 MG/ML
INJECTION INTRAMUSCULAR; INTRAVENOUS AS NEEDED
Status: DISCONTINUED | OUTPATIENT
Start: 2018-04-11 | End: 2018-04-11 | Stop reason: HOSPADM

## 2018-04-11 RX ORDER — CEFAZOLIN SODIUM/WATER 2 G/20 ML
2 SYRINGE (ML) INTRAVENOUS ONCE
Status: COMPLETED | OUTPATIENT
Start: 2018-04-11 | End: 2018-04-11

## 2018-04-11 RX ORDER — AMOXICILLIN 250 MG
2 CAPSULE ORAL DAILY
Status: DISCONTINUED | OUTPATIENT
Start: 2018-04-12 | End: 2018-04-13 | Stop reason: HOSPADM

## 2018-04-11 RX ORDER — DIPHENHYDRAMINE HCL 25 MG
25 CAPSULE ORAL
Status: DISCONTINUED | OUTPATIENT
Start: 2018-04-11 | End: 2018-04-13 | Stop reason: HOSPADM

## 2018-04-11 RX ORDER — DEXAMETHASONE SODIUM PHOSPHATE 100 MG/10ML
10 INJECTION INTRAMUSCULAR; INTRAVENOUS ONCE
Status: ACTIVE | OUTPATIENT
Start: 2018-04-12 | End: 2018-04-13

## 2018-04-11 RX ADMIN — EPHEDRINE SULFATE 10 MG: 50 INJECTION, SOLUTION INTRAVENOUS at 13:43

## 2018-04-11 RX ADMIN — SODIUM CHLORIDE, SODIUM LACTATE, POTASSIUM CHLORIDE, CALCIUM CHLORIDE: 600; 310; 30; 20 INJECTION, SOLUTION INTRAVENOUS at 13:08

## 2018-04-11 RX ADMIN — DEXAMETHASONE SODIUM PHOSPHATE 10 MG: 4 INJECTION, SOLUTION INTRA-ARTICULAR; INTRALESIONAL; INTRAMUSCULAR; INTRAVENOUS; SOFT TISSUE at 13:39

## 2018-04-11 RX ADMIN — ACETAMINOPHEN 1000 MG: 500 TABLET, FILM COATED ORAL at 23:43

## 2018-04-11 RX ADMIN — FENTANYL CITRATE 100 MCG: 50 INJECTION INTRAMUSCULAR; INTRAVENOUS at 12:52

## 2018-04-11 RX ADMIN — HYDROMORPHONE HYDROCHLORIDE 1 MG: 2 TABLET ORAL at 22:25

## 2018-04-11 RX ADMIN — ASPIRIN 81 MG: 81 TABLET, COATED ORAL at 21:11

## 2018-04-11 RX ADMIN — PROPOFOL 100 MCG/KG/MIN: 10 INJECTION, EMULSION INTRAVENOUS at 13:26

## 2018-04-11 RX ADMIN — TUBERCULIN PURIFIED PROTEIN DERIVATIVE 5 UNITS: 5 INJECTION, SOLUTION INTRADERMAL at 08:58

## 2018-04-11 RX ADMIN — SODIUM CHLORIDE, SODIUM LACTATE, POTASSIUM CHLORIDE, AND CALCIUM CHLORIDE 500 ML: 600; 310; 30; 20 INJECTION, SOLUTION INTRAVENOUS at 08:58

## 2018-04-11 RX ADMIN — ONDANSETRON 4 MG: 2 INJECTION INTRAMUSCULAR; INTRAVENOUS at 13:10

## 2018-04-11 RX ADMIN — CELECOXIB 200 MG: 200 CAPSULE ORAL at 21:11

## 2018-04-11 RX ADMIN — APREPITANT 80 MG: 40 CAPSULE ORAL at 08:57

## 2018-04-11 RX ADMIN — PROPOFOL 40 MG: 10 INJECTION, EMULSION INTRAVENOUS at 13:26

## 2018-04-11 RX ADMIN — EPHEDRINE SULFATE 10 MG: 50 INJECTION, SOLUTION INTRAVENOUS at 14:35

## 2018-04-11 RX ADMIN — ROPIVACAINE HYDROCHLORIDE 20 ML: 5 INJECTION, SOLUTION EPIDURAL; INFILTRATION; PERINEURAL at 12:54

## 2018-04-11 RX ADMIN — Medication 2 G: at 22:00

## 2018-04-11 RX ADMIN — TRANEXAMIC ACID 1000 MG: 100 INJECTION, SOLUTION INTRAVENOUS at 13:47

## 2018-04-11 RX ADMIN — SODIUM CHLORIDE, SODIUM LACTATE, POTASSIUM CHLORIDE, CALCIUM CHLORIDE: 600; 310; 30; 20 INJECTION, SOLUTION INTRAVENOUS at 13:26

## 2018-04-11 RX ADMIN — ONDANSETRON 4 MG: 2 INJECTION INTRAMUSCULAR; INTRAVENOUS at 22:25

## 2018-04-11 RX ADMIN — MIDAZOLAM HYDROCHLORIDE 2 MG: 1 INJECTION, SOLUTION INTRAMUSCULAR; INTRAVENOUS at 12:52

## 2018-04-11 RX ADMIN — Medication 2 G: at 13:08

## 2018-04-11 RX ADMIN — ACETAMINOPHEN 1000 MG: 10 INJECTION, SOLUTION INTRAVENOUS at 19:28

## 2018-04-11 NOTE — PERIOP NOTES
TRANSFER - OUT REPORT:    Verbal report given to Louise Coco [place] hillary Amezcua  being transferred to preop block for routine progression of care       Report consisted of patients Situation, Background, Assessment and   Recommendations(SBAR). Information from the following report(s) SBAR, MAR and Recent Results was reviewed with the receiving nurse. Lines:   Peripheral IV 04/11/18 Left Arm (Active)   Site Assessment Clean, dry, & intact 4/11/2018  8:50 AM   Phlebitis Assessment 0 4/11/2018  8:50 AM   Dressing Status Clean, dry, & intact 4/11/2018  8:50 AM   Dressing Type Transparent;Tape 4/11/2018  8:50 AM   Hub Color/Line Status Pink; Infusing 4/11/2018  8:50 AM   Action Taken Blood drawn 4/11/2018  8:50 AM        Opportunity for questions and clarification was provided. Patient transported with:   Tech    Pt reports extreme N/V with anesthesia and pain meds after last TKA 6 years ago.   Dr Raj Dixon ordered Emend and Scopalamine patch and informed nurses to care for pt about her concern with post op N/V.

## 2018-04-11 NOTE — CONSULTS
Hospitalist Note     Admit Date:  2018  4:43 AM   Name:  Chrys Denver   Age:  66 y.o.  :  1940   MRN:  [id number]   PCP:  Ramsey Carrington MD  Treatment Team: Attending Provider: Arias Leach MD; Utilization Review: Gita Gutiérrez RN; Consulting Provider: Agus Jennings MD; Care Manager: DANIELA Fernandez; Consulting Provider: Ara Barron MD; Consulting Provider: Rosa Isela Mendoza MD      Subjective:   Hospitalists consulted for assistance with management. Chart reviewed. Chronic issues stable. No acute IM issues.        Objective:   Patient Vitals for the past 24 hrs:   Temp Pulse Resp BP SpO2   18 1753 97.3 °F (36.3 °C) 60 16 (!) 143/91 99 %   18 1726 - 62 16 145/90 100 %   18 1710 - 60 16 147/77 100 %   18 1655 - (!) 59 16 146/73 100 %   18 1640 - 68 16 124/87 100 %   18 1635 - 68 16 115/70 100 %   18 1630 - 64 16 128/79 98 %   18 1624 97.5 °F (36.4 °C) 82 16 122/66 98 %   18 1301 - 64 16 168/90 98 %   18 1256 - 64 16 153/78 99 %   18 1254 - 62 16 157/84 98 %   18 1251 - 71 16 176/88 100 %   18 1246 - 66 16 181/86 100 %   18 0838 97.1 °F (36.2 °C) 75 16 (!) 162/91 99 %     Oxygen Therapy  O2 Sat (%): 99 % (18 1753)  Pulse via Oximetry: 72 beats per minute (18 1200)  O2 Device: Nasal cannula (18)  O2 Flow Rate (L/min): 2 l/min (18 172)    Intake/Output Summary (Last 24 hours) at 18  Last data filed at 04/11/18 1738   Gross per 24 hour   Intake             2900 ml   Output              980 ml   Net             1920 ml         Current Meds:  Current Facility-Administered Medications   Medication Dose Route Frequency    scopolamine (TRANSDERM-SCOP) 1 mg over 3 days 1 Patch  1 Patch TransDERmal Q72H    tuberculin injection 5 Units  5 Units IntraDERMal ONCE    fluticasone (FLONASE) 50 mcg/actuation nasal spray 2 Spray  2 Spray Both Nostrils DAILY PRN    [START ON 4/12/2018] levothyroxine (SYNTHROID) tablet 88 mcg  88 mcg Oral ACB    [START ON 4/12/2018] lisinopril (PRINIVIL, ZESTRIL) tablet 10 mg  10 mg Oral DAILY    LORazepam (ATIVAN) tablet 0.5 mg  0.5 mg Oral Q8H PRN    0.9% sodium chloride infusion  100 mL/hr IntraVENous CONTINUOUS    sodium chloride (NS) flush 5-10 mL  5-10 mL IntraVENous Q8H    sodium chloride (NS) flush 5-10 mL  5-10 mL IntraVENous PRN    ceFAZolin (ANCEF) 2 g/20 mL in sterile water IV syringe  2 g IntraVENous Q8H    [START ON 4/12/2018] acetaminophen (TYLENOL) tablet 1,000 mg  1,000 mg Oral Q6H    celecoxib (CELEBREX) capsule 200 mg  200 mg Oral Q12H    HYDROmorphone (DILAUDID) tablet 2 mg  2 mg Oral Q4H PRN    HYDROmorphone (PF) (DILAUDID) injection 1 mg  1 mg IntraVENous Q3H PRN    naloxone (NARCAN) injection 0.2-0.4 mg  0.2-0.4 mg IntraVENous Q10MIN PRN    [START ON 4/12/2018] dexamethasone (DECADRON) injection 10 mg  10 mg IntraVENous ONCE    ondansetron (ZOFRAN) injection 4 mg  4 mg IntraVENous Q4H PRN    diphenhydrAMINE (BENADRYL) capsule 25 mg  25 mg Oral Q4H PRN    [START ON 4/12/2018] senna-docusate (PERICOLACE) 8.6-50 mg per tablet 2 Tab  2 Tab Oral DAILY    aspirin delayed-release tablet 81 mg  81 mg Oral Q12H       Data Review:   Recent Results (from the past 24 hour(s))   PTT    Collection Time: 04/11/18  9:08 AM   Result Value Ref Range    aPTT 32.7 23.2 - 35.3 SEC   TYPE & SCREEN    Collection Time: 04/11/18  9:08 AM   Result Value Ref Range    Crossmatch Expiration 04/14/2018     ABO/Rh(D) [place] Susan POSITIVE     Antibody screen NEG    GLUCOSE, POC    Collection Time: 04/11/18  9:17 AM   Result Value Ref Range    Glucose (POC) 85 65 - 100 mg/dL       All Micro Results     Procedure Component Value Units Date/Time    CULTURE, Speedy Clink STAIN [[id number]] Collected:  04/11/18 1434    Order Status:  Completed Specimen:  Wound from Knee  Updated:  04/11/18 1811    CULTURE, ANAEROBIC [[id number]] Collected: 04/11/18 1432    Order Status:  Completed Specimen:  Knee  Updated:  04/11/18 1811    CULTURE, Leighton Marina STAIN [[id number]] Collected:  04/11/18 1427    Order Status:  Completed Specimen:  Wound from Knee  Updated:  04/11/18 1811    CULTURE, ANAEROBIC [[id number]] Collected:  04/11/18 1427    Order Status:  Completed Specimen:  Knee  Updated:  04/11/18 1811    CULTURE, ANAEROBIC [[id number]] Collected:  04/11/18 1426    Order Status:  Completed Specimen:  Knee  Updated:  04/11/18 1811    CULTURE, Leighton Marina STAIN [[id number]] Collected:  04/11/18 1426    Order Status:  Completed Specimen:  Wound from Knee  Updated:  04/11/18 1810    CULTURE, Leighton Marina STAIN [[id number]] Collected:  04/11/18 1426    Order Status:  Completed Specimen:  Wound from Knee  Updated:  04/11/18 1810    CULTURE, ANAEROBIC [[id number]] Collected:  04/11/18 1425    Order Status:  Completed Specimen:  Knee  Updated:  04/11/18 1810          Other Studies:  Xr Knee Lt Max 2 Vws    Result Date: 4/11/2018  Left knee series HISTORY: Postop evaluation. 2 views of the left knee were obtained. No prior studies are available for comparison. FINDINGS: The patient status post revision arthroplasty with long femoral and tibial stem components. Alignment is anatomic. There is no acute periprosthetic fracture. The surgical drain overlies the suprapatellar bursa. Numerous radiopaque antibiotic beads are present about the knee. IMPRESSION: Revision left knee arthroplasty with anatomic alignment. Assessment and Plan:     Hospital Problems as of 4/11/2018  Never Reviewed          Codes Class Noted - Resolved POA    * (Principal)Status post revision of total knee replacement, left ICD-10-CM: Z63.576  ICD-9-CM: V43.65  4/11/2018 - Present Unknown              PLAN:  · Agree with current treatment plan as per primary team.  · Nothing acute going on from our standpoint; nursing may call us with any issues. · No charge billed to the patient for this. Thank you.     Signed:  Kailey Lake MD

## 2018-04-11 NOTE — OP NOTES
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 Tourniquet Time: 60 minutes  EBL: 500cc  Findings: There was a very large mass medially that was determined to be a popliteal cyst that communicated with the popliteal space and wrapped around the medial aspect of the knee. This was decompressed  And 500cc of yellow synovial appearing fluid was evacuated. The cystic tissue excised to the degree that was safely felt. The femoral component showed signs of osteolysis medially. There was no poly wear. BMI: Body mass index is 29.19 kg/(m^2). Elvi Sidhu was brought to the operating room and positioned on the operating table. He was anethestized with anesthesia. A guillen catheter was placed preoperatively and IV antibiotics was administered. Prior to the incision being made a timeout was called identifying the patient, procedure ,operative side and surgeon. A pneumatic tourniquet was placed about the limb and the left leg was prepped and draped in the usual sterile manner. The tourniquet was inflated. An anterior longitudinal incision was accomplished just medial to the tibial tubercle and extending approximal 6 centimeters proximal to the superior pole of the patella.   The very large medial mass was decompressed  and 500cc of yellow synovial appearing fluid was evacuated. The cystic tissue excised to the degree that was safely felt     A medial parapatellar capsular incision was performed. The medial capsular flap was elevated around to the insertion of the semimembranous tendon. Opening cultures were obtained. Synovectomy was then performed in the suprapatellar pouch, medial and lateral gutters and the infrapatellar region. The patella was mobilized laterally as the knee was flexed. Using an offset osteotome, an interval was made at the prosthesis/cement interface around the femoral prothesis beginning first at the anterior cut followed by the anterior chamfer, distal cut, posterior chamfer, and posterior cut, both medial and lateral. The femoral component was removed without difficulty. The condyles showed no osteolysis. Our attention was then turned to the tibia again using an offset osteotome and flexible osteotome. Th tibial component was removed without difficulty. The remaining cement was removed with a bur and combined with osteomes and taps. Cultures were obtained of the tibia and Femur. The intermedullary canal of the femur and tibia were? reamed sequentially with the power reamers to accommodate the femoral and tibial stems. These sizes are listed below  The initial broach from the metaphyseal sleeve was intact and placed into the femur using the TC3 jayro on the broach to determine sleeve depth. The metaphyseal sleeve broach was left in place as was the stem trial, and size specific block was slid over and placed against the distal cut. The determination for distal augments was made. These sizes are listed below. Anterior and posterior cuts were re-checked and the block was pinned in place. The determination for posterior augements was made. These are listed below. The block for the anterior and posterior chamfers was placed and the cuts made.  The cutting block for the notch cut was also pinned and placed on the femur and cuts made using the oscillating saw on the anterior, posterior chamfer cuts, using a reciprocating saw the notch cut was made. Our attention was then turned to the tibia. The initial starter reamer for the metaphyseal sleeve was then used with the appropriate stem extension. The canal was then broached with the metaphyseal sleeve. This broach was left in place, and final cut was made on the proximal end of the tibia. The MBT revision tibial base plate was then assembled with the stem and sleeve and impacted on to the proximal end of the tibia. A trial TC3 femoral component was then assembled with the stem and impacted on to the distal end of the femur. Trial reduction was then performed with incremental size trial bearing surfaces. The TC3 bearing surface matching the femur was inserted and allowed for full extension, good medial, lateral stability at 90 degrees of flexion, especially medially. The patella was everted and then removed using the precision saw. The excess cement was removed with the esau. Three drill holes were made to accomodate the oval patella. A trial was placed and the knee was carried through a range of motion. It was found to tract well. The trial components were then all removed. The real components were opened on the back table along with the metaphyseal sleeves. Stem extensions and appropriate sleeves were then assembled to the femur and tibia. The cut surfaces of the bone were prepared for cementing using the PulsaVac lavage. Prior to this, the Aquamantys was used to cauterize any soft tissue bleeding. Two bags of cement were mixed. The femoral, tibial and patellar components were cemented into place, pressurized with the trial plastic lining. Excess cement was removed from around the edge of bone using plastic curette. Once the cement had hardened, the trial component was removed.  The real polyethylene was inserted. [employer] Sigma TC3 knee was placed through range of motion and noted to be stable as mentioned above with the trail components. The wound was dry, therefore no drain was used. The operative knee was injected with 60cc of Naropin, 10 cc's of morphine and 1 cc of 30mg of Toradol dependent upon the patients allergies and renal function.        The knee was then soaked with a diluted betadine solution for approximately 3 min. This was then thoroughly irrigated. A medium hemovac drain was inserted. The antimicrobial beads were placed. The knee was placed through range of motion and noted to be stable as mentioned above. The pneumatic tourniquet was deflated. The wound was dry, therefore no drain was used. The operative knee was injected with 60cc of Nuropin, 10 cc's of morphine and 1 cc of 30mg of Toradol. The capsular layer was closed using a #1 PDS suture. Then, 1 gram (100mg/ml) of Transexamic Acid was injected into the joint space The subcutaneous layers were closed using 2-0 Vicryl interrupted sutures. Finally the skin was closed using 3-0 Vicryl and skin staples, which were applied in occlusive fashion and sterile bandage applied. Sponge count and needle counts were correct Corinna Denver left the operating room for recovery. Implants:   Implant Name Type Inv.  Item Serial No.  Lot No. LRB No. Used   CEMENT BNE GENTAMC HV R+G 40GM -- PALACOS R+G [id number] - [id number]  CEMENT BNE GENTAMC HV R+G 40GM -- PALACOS R+G [id number] [facility] [id number] Left 1   CEMENT BNE GENTAMC HV R+G 40GM -- PALACOS R+G [id number] - [id number]  CEMENT BNE GENTAMC HV R+G 40GM -- PALACOS R+G [id number] [facility] [id number] Left 1   GRAFT BNE PASTE RAPID CUR 10ML -- STIMULAN - [id number]  GRAFT BNE PASTE RAPID CUR 10ML -- STIMULAN 08/17-R351/352 [employer] 08/17-R351/352 Left 1   AUG DSTL PFC 12MM SZ 3 LT -- SIGMA - [id number]  AUG DSTL PFC 12MM SZ 3 LT -- SIGMA [id number] [address] [place] [address] 552529 Left 1   TRAY TIB VINCENT REV MBT SZ 3 --  - [id number]  TRAY TIB VINCENT REV MBT SZ 3 --  [id number] [facility] [id number] Left 1   PAT DOME OV PFC 3PEG 38MM -- SIGMA - [id number]  PAT DOME OV PFC 3PEG 38MM -- SIGMA [id number] [facility] [id number] Left 1   INSERT TIB RP TC3 SZ 3 22. 5MM --  - [id number]  INSERT TIB RP TC3 SZ 3 22. 5MM --  [id number] [facility] [id number] Left 1   AUG DSTL PFC 12MM SZ 3 LT -- SIGMA - [id number]  AUG DSTL PFC 12MM SZ 3 LT -- SIGMA [id number] [facility] [id number] Left 1   TRAY TIB VINCENT REV MBT SZ 3 --  - [id number]  TRAY TIB VINCENT REV MBT SZ 3 --  [id number] [facility] [id number] Left 1   SLEEVE TY TIB POR MBT M/L 37MM --  - [id number]  SLEEVE TY TIB POR MBT M/L 37MM --  [id number] [facility] [id number] Left 1   SLEEVE FEM FUL PRCOAT UNI 34MM --  - [id number]  SLEEVE FEM FUL PRCOAT UNI 34MM --  [id number] [facility] [id number] Left 1   STEM FEM UNIV FLUT 60Z12YW --  - [id number]  STEM FEM UNIV FLUT 67G34GS --  [id number] [facility] [id number] Left 1   COMPNT FEM PFC TC3 LT SZ 3 -- SIGMA - [id number]  COMPNT FEM PFC TC3 LT SZ 3 -- SIGMA [id number] [facility] [id number] Left 1   STEM FEM UNIV FLUT 68I74FC --  - [id number]  STEM FEM UNIV FLUT 69N99HA --  [id number] [facility] [id number] Left 1   ADPT FEM STEM SIGMA 5 DEG --  - [id number]  ADPT FEM STEM SIGMA 5 DEG --  [id number] [facility] [id number] Left 1   AUG DSTL PFC 8MM SZ 3 LT -- SIGMA - [id number]  AUG DSTL PFC 8MM SZ 3 LT -- SIGMA [id number] [facility] [id number] Left 1   BOLT ADPT FEM OFST SIG +2/2MM --  - [id number]   BOLT ADPT FEM OFST SIG +2/2MM --  [id number] [facility] [id number] Left 1           Signed By: Su Rubinstein, MD    4/11/2018,  4:21 PM

## 2018-04-11 NOTE — ANESTHESIA POSTPROCEDURE EVALUATION
Post-Anesthesia Evaluation and Assessment    Patient: Chrys Denver MRN: [id number]  SSN: [id number]    YOB: 1940  Age: 66 y.o. Sex: female       Cardiovascular Function/Vital Signs  Visit Vitals    /90    Pulse 62    Temp 36.4 °C (97.5 °F)    Resp 16    Ht 5' 8\" (1.727 m)    Wt 87.1 kg (192 lb)    SpO2 100%    BMI 29.19 kg/m2       Patient is status post spinal anesthesia for Procedure(s):  LEFT KNEE ARTHROPLASTY TOTAL REVISION/DEPUY/ FROZEN SECTION. Nausea/Vomiting: None    Postoperative hydration reviewed and adequate. Pain:  Pain Scale 1: Visual (04/11/18 1726)  Pain Intensity 1: 0 (04/11/18 1710)   Managed    Neurological Status:   Neuro (WDL): Within Defined Limits (04/11/18 1655)  Neuro  Neurologic State: Alert (04/11/18 1655)  LUE Motor Response: Purposeful (04/11/18 1655)  LLE Motor Response: Pharmacologically paralyzed;Numbness (04/11/18 1655)  RUE Motor Response: Purposeful (04/11/18 1655)  RLE Motor Response: Pharmacologically paralyzed;Numbness (04/11/18 1655)   At baseline    Mental Status and Level of Consciousness: Arousable    Pulmonary Status:   O2 Device: Nasal cannula (04/11/18 1726)   Adequate oxygenation and airway patent    Complications related to anesthesia: None    Post-anesthesia assessment completed.  No concerns    Signed By: Josephine Elizabeth MD     April 11, 2018

## 2018-04-11 NOTE — PROGRESS NOTES
TRANSFER - IN REPORT:    Verbal report received from Dillon Glover RN on Teri Howard  being received from PACU for routine post - op      Report consisted of patients Situation, Background, Assessment and   Recommendations(SBAR). Information from the following report(s) SBAR, Intake/Output and MAR was reviewed with the receiving nurse. Opportunity for questions and clarification was provided. Assessment completed upon patients arrival to unit and care assumed. Oriented to room, bed controls, and how to order meals. Family in room. Ace bandage dry and intact to L LE with HVAC charged, small amount bloody drainage in HVAC. Unable to move feet yet. Iceman to knee. SCDs to LEs. Yellow gripper socks to feet and instructed not to get up without staff to assist. C/o L knee burning but thinks iceman is helping. No c/o nausea.

## 2018-04-11 NOTE — ANESTHESIA PROCEDURE NOTES
Peripheral Block    Start time: 4/11/2018 12:51 PM  End time: 4/11/2018 12:54 PM  Performed by: Yi Malhotra  Authorized by: Yi Malhotra       Pre-procedure: Indications: at surgeon's request, post-op pain management and procedure for pain    Preanesthetic Checklist: patient identified, risks and benefits discussed, site marked, timeout performed, anesthesia consent given and patient being monitored    Timeout Time: 12:50          Block Type:   Block Type:   Adductor canal  Laterality:  Left  Monitoring:  Standard ASA monitoring, responsive to questions, oxygen, continuous pulse ox, frequent vital sign checks and heart rate  Injection Technique:  Single shot  Procedures: ultrasound guided    Patient Position: supine  Prep: chlorhexidine    Location:  Mid thigh  Needle Type:  Stimuplex  Needle Gauge:  22 G  Needle Localization:  Ultrasound guidance  Medication Injected:  0.5%  ropivacaine  Volume (mL):  20    Assessment:  Number of attempts:  1  Injection Assessment:  Incremental injection every 5 mL, no paresthesia, ultrasound image on chart, no intravascular symptoms, negative aspiration for blood and local visualized surrounding nerve on ultrasound  Patient tolerance:  Patient tolerated the procedure well with no immediate complications

## 2018-04-11 NOTE — H&P
The patient has a painful left TKA. The patient was see and examined and there are no changes to the patient's orthopedic condition. . The necessity for the joint replacement revision is still present, and the H&P from the office is still current. The patient will be admitted today for Procedure(s) (LRB):  LEFT KNEE ARTHROPLASTY TOTAL REVISION/DEPUY/ FROZEN SECTION (Left) .

## 2018-04-11 NOTE — PERIOP NOTES
TRANSFER - IN REPORT:    Verbal report received from [facility]) on [facility]  being received from [facility] [place](unit) for routine progression of care      Report consisted of patients Situation, Background, Assessment and   Recommendations(SBAR). Information from the following report(s) SBAR, Kardex and MAR was reviewed with the receiving nurse. Opportunity for questions and clarification was provided.

## 2018-04-11 NOTE — PERIOP NOTES
Betadine lavage:  17.5cc of betadine lot #[id number], exp. 09/19,  in 500cc of . 9NS Lot #[id number], exp. [id number]: LEFT KNEE

## 2018-04-11 NOTE — IP AVS SNAPSHOT
[address] [facility] 
 
 
 [id number] 
[phone] Patient: Eloise Pan MRN: [id number] YAQ:[id number] About your hospitalization You were admitted on:  April 11, 2018 You last received care in the:  [facility] You were discharged on:  April 13, 2018 Why you were hospitalized Your primary diagnosis was:  Status Post Revision Of Total Knee Replacement, Left Follow-up Information Follow up With Details Comments Contact Info Shelley Escobedo MD  As needed [address] [place] [id number] 
[phone] Soraya Casper MD  As scheduled by office [address] [address] 
[phone] [address]  Will contact you within 48 hrs [place] [address] [address] [place] [address] 
[phone] Discharge Orders Procedure Order Date Status Priority Quantity Spec Type Associated Dx CALL YOUR DOCTOR For: Temperature greater than 100.4., Severe uncontrolled pain. , Persistant nausea and vomiting., Persistant dizziness or light-headedness. , Hives, Difficulty breathing, headache, or visual disturbances. , Redness, tenderness, or s. .. 04/11/18 1339 Normal Routine 1  Status post revision of total knee replacement, left [[id number]] Questions: For:  Temperature greater than 100.4. For:  Severe uncontrolled pain. For:  Persistant nausea and vomiting. For:  Persistant dizziness or light-headedness. For:  Chivo Smijacquie For:  Difficulty breathing, headache, or visual disturbances. For:  Redness, tenderness, or signs of infection. ACTIVITY AFTER DISCHARGE Patient should: Restrict driving, Restrict lifting, Other (specify) 04/11/18 1339 Normal Routine 1  Status post revision of total knee replacement, left [[id number]] Questions: Patient should:  Restrict driving Patient should:  Restrict lifting Patient should: Other (specify) DRESSING, CHANGE SPECIFY 04/11/18 1339 Normal Routine 1  Status post revision of total knee replacement, left [[id number]] Comments:  Routine dressing changes. Notify if excessive drainage. If staples are present they are to be removed 10 days post surgery and steri strips applied. REFERRAL TO HOME HEALTH 04/11/18 1339 Normal Routine 1  Status post revision of total knee replacement, left [[id number]] REFERRAL TO PHYSICAL THERAPY 04/11/18 1339 Normal Routine 1  Status post revision of total knee replacement, left [[id number]] Comments:  Referral to Home PT A check jayro indicates which time of day the medication should be taken. My Medications START taking these medications Instructions Each Dose to Equal  
 Morning Noon Evening Bedtime  
 aspirin delayed-release 81 mg tablet Your next dose is: Today Take 1 Tab by mouth every twelve (12) hours for 35 days. 81 mg HYDROmorphone 2 mg tablet Commonly known as:  DILAUDID Your next dose is: Today Take 1 Tab by mouth every four (4) hours as needed for Pain. Max Daily Amount: 12 mg.  
 2 mg CONTINUE taking these medications Instructions Each Dose to Equal  
 Morning Noon Evening Bedtime CeleBREX 200 mg capsule Generic drug:  celecoxib Your next dose is:  Tomorrow Take 200 mg by mouth daily. 200 mg FLONASE ALLERGY RELIEF 50 mcg/actuation nasal spray Generic drug:  fluticasone Your next dose is:  Tomorrow 2 Sprays by Both Nostrils route daily as needed for Rhinitis. 2 Spray  
    
  
   
   
   
  
 levothyroxine 88 mcg tablet Commonly known as:  SYNTHROID Your next dose is:  Tomorrow Take 88 mcg by mouth Daily (before breakfast). 88 mcg  
    
  
   
   
   
  
 lisinopril 10 mg tablet Commonly known as:  Juan C Fairy Your next dose is:  Tomorrow Take 10 mg by mouth daily. 10 mg LORazepam 0.5 mg tablet Commonly known as:  ATIVAN Your next dose is: Today Take 1 Tab by mouth every eight (8) hours as needed for Anxiety. 0.5 mg  
    
   
   
  
   
  
 multivitamin tablet Commonly known as:  ONE A DAY Your next dose is:  Tomorrow Take 1 Tab by mouth daily. 1 Tab Where to Get Your Medications Information on where to get these meds will be given to you by the nurse or doctor. ! Ask your nurse or doctor about these medications  
  aspirin delayed-release 81 mg tablet HYDROmorphone 2 mg tablet Opioid Education Prescription Opioids: What You Need to Know: 
 
Prescription opioids can be used to help relieve moderate-to-severe pain and are often prescribed following a surgery or injury, or for certain health conditions. These medications can be an important part of treatment but also come with serious risks. Opioids are strong pain medicines. Examples include hydrocodone, oxycodone, fentanyl, and morphine. Heroin is an example of an illegal opioid. It is important to work with your health care provider to make sure you are getting the safest, most effective care. WHAT ARE THE RISKS AND SIDE EFFECTS OF OPIOID USE? Prescription opioids carry serious risks of addiction and overdose, especially with prolonged use. An opioid overdose, often marked by slow breathing, can cause sudden death. The use of prescription opioids can have a number of side effects as well, even when taken as directed. · Tolerance-meaning you might need to take more of a medication for the same pain relief · Physical dependence-meaning you have symptoms of withdrawal when the medication is stopped. Withdrawal symptoms can include nausea, sweating, chills, diarrhea, stomach cramps, and muscle aches.   Withdrawal can last up to several weeks, depending on which drug you took and how long you took it. · Increased sensitivity to pain · Constipation · Nausea, vomiting, and dry mouth · Sleepiness and dizziness · Confusion · Depression · Low levels of testosterone that can result in lower sex drive, energy, and strength · Itching and sweating RISKS ARE GREATER WITH:      
· History of drug misuse, substance use disorder, or overdose · Mental health conditions (such as depression or anxiety) · Sleep apnea · Older age (72 years or older) · Pregnancy Avoid alcohol while taking prescription opioids. Also, unless specifically advised by your health care provider, medications to avoid include: · Benzodiazepines (such as Xanax or Valium) · Muscle relaxants (such as Soma or Flexeril) · Hypnotics (such as Ambien or Lunesta) · Other prescription opioids KNOW YOUR OPTIONS Talk to your health care provider about ways to manage your pain that don't involve prescription opioids. Some of these options may actually work better and have fewer risks and side effects. Options may include: 
· Pain relievers such as acetaminophen, ibuprofen, and naproxen · Some medications that are also used for depression or seizures · Physical therapy and exercise · Counseling to help patients learn how to cope better with triggers of pain and stress. · Application of heat or cold compress · Massage therapy · Relaxation techniques Be Informed Make sure you know the name of your medication, how much and how often to take it, and its potential risks & side effects. IF YOU ARE PRESCRIBED OPIOIDS FOR PAIN: 
· Never take opioids in greater amounts or more often than prescribed. Remember the goal is not to be pain-free but to manage your pain at a tolerable level. · Follow up with your primary care provider to: · Work together to create a plan on how to manage your pain. · Talk about ways to help manage your pain that don't involve prescription opioids. · Talk about any and all concerns and side effects. · Help prevent misuse and abuse. · Never sell or share prescription opioids · Help prevent misuse and abuse. · Store prescription opioids in a secure place and out of reach of others (this may include visitors, children, friends, and family). · Safely dispose of unused/unwanted prescription opioids: Find your community drug take-back program or your pharmacy mail-back program, or flush them down the toilet, following guidance from the Food and Drug Administration (www.fda.gov/Drugs/ResourcesForYou). · Visit www.cdc.gov/drugoverdose to learn about the risks of opioid abuse and overdose. · If you believe you may be struggling with addiction, tell your health care provider and ask for guidance or call [facility] at [phone]. Discharge Instructions [facility] [employer] Patient Discharge Instructions Ronny Llamas / [id number] : 1940 Admitted 2018 Discharged: 2018 IF YOU HAVE ANY PROBLEMS ONCE YOU ARE AT HOME CALL THE FOLLOWING NUMBERS:  
Main office number: [phone] Medications · The medications you are to continue on are listed on the medication reconciliation sheet. · Narcotic pain medications as well as supplemental iron can cause constipation. If this occurs try stopping the narcotic pain medication and/or the iron. · It is important that you take the medication exactly as they are prescribed. · Medications which increase your risk of blood clots are listed to stop for 5 weeks after surgery as well as medications or supplements which increase your risk of bleeding complications.   
· Keep your medication in the bottles provided by the pharmacist and keep a list of the medication names, dosages, and times to be taken in your wallet. · Do not take other medications without consulting your doctor. Important Information Do NOT smoke as this will greatly increase your risk of infection! Resume your prehospital diet. If you have excessive nausea or vomitting call your doctor's office Leg swelling and warmth is normal for 6 months after surgery. If you experience swelling in your leg elevate you leg while laying down with your toes above your heart. If you have sudden onset severe swelling with leg pain call our office. Use Abdi Hose stockings until we see you in the office for your follow up appointment. The stitches deep inside take approximately 6 months to dissolve. There will be sharp shooting, stinging and burning pain. This is normal and will resolve between 3-6 months after surgery. Difficulty sleeping is normal following total Knee and Hip replacement. You may try melatonin, an over-the-counter sleep aid or benadryl to help with sleep. Most patients will resume sleeping through the night 8 weeks after surgery. Home Physical Therapy is arranged. Home Health will contact you within 48 hrs of discharge that you have chosen. If you have not received a call within this time frame please contact that provider you chose. You should be given this information before you leave the hospital.  
 
You are at a risk for falls. Use the rolling walker when walking. Patients who have had a joint replacement should not drive if they are still taking narcotic pain mediation during the daytime hours. Most patients wean themselves off of pain medication within 2-5 weeks after surgery. When to Call the office - If you have a temperature greater then 101 
- Uncontrolled vomiting - Loose control of your bladder or bowel function - Are unable to bear any weight  
- Need a pain medication refill DISCHARGE SUMMARY from Nurse The following personal items collected during your admission are returned to you:  
Dental Appliance: Dental Appliances: Partials, Uppers, With patient Vision: Visual Aid: Glasses Hearing Aid:   na 
Jewelry: Jewelry: Ring, Earrings Clothing:   self Other Valuables: Other Valuables: Cell Phone (all valuables with [employer]) Valuables sent to safe:   na 
 
PATIENT INSTRUCTIONS: 
 
After general anesthesia or intravenous sedation, for 24 hours or while taking prescription Narcotics: · Limit your activities · Do not drive and operate hazardous machinery · Do not make important personal or business decisions · Do  not drink alcoholic beverages · If you have not urinated within 8 hours after discharge, please contact your surgeon on call. Report the following to your surgeon: 
· Excessive pain, swelling, redness or odor of or around the surgical area · Temperature over 101 · Nausea and vomiting lasting longer than 4 hours or if unable to take medications · Any signs of decreased circulation or nerve impairment to extremity: change in color, persistent  numbness, tingling, coldness or increase pain · Any questions, call office @ [phone] Keep scheduled follow up appointment. If need to change, call office @ [phone]. *  Please give a list of your current medications to your Primary Care Provider. *  Please update this list whenever your medications are discontinued, doses are 
    changed, or new medications (including over-the-counter products) are added. *  Please carry medication information at all times in case of emergency situations. Total Knee Replacement: What to Expect at Home Your Recovery When you leave the hospital, you should be able to move around with a walker or crutches. But you will need someone to help you at home for the next few weeks or until you have more energy and can move around better.  If there is no one to help you at home, you may go to a rehabilitation center. You will go home with a bandage and stitches or staples. Change the bandage as your doctor tells you to. Your doctor will remove your stitches or staples 10 to 21 days after your surgery. You may still have some mild pain, and the area may be swollen for 3 to 6 months after surgery. Your knee will continue to improve for 6 to 12 months. You will probably use a walker for 1 to 3 weeks and then use crutches. When you are ready, you can use a cane. You will probably be able to walk on your own in 4 to 8 weeks. You will need to do months of physical rehabilitation (rehab) after a knee replacement. Rehab will help you strengthen the muscles of the knee and help you regain movement. After you recover, your artificial knee will allow you to do normal daily activities with less pain or no pain at all. You may be able to hike, dance, ride a bike, and play golf. Talk to your doctor about whether you can do more strenuous activities. Always tell your caregivers that you have an artificial knee. How long it will take to walk on your own, return to normal activities, and go back to work depends on your health and how well your rehabilitation (rehab) program goes. The better you do with your rehab exercises, the quicker you will get your strength and movement back. This care sheet gives you a general idea about how long it will take for you to recover. But each person recovers at a different pace. Follow the steps below to get better as quickly as possible. How can you care for yourself at home? Activity ? · Rest when you feel tired. You may take a nap, but do not stay in bed all day. When you sit, use a chair with arms. You can use the arms to help you stand up. ? · Work with your physical therapist to find the best way to exercise. You may be able to take frequent, short walks using crutches or a walker.  What you can do as your knee heals will depend on whether your new knee is cemented or uncemented. You may not be able to do certain things for a while if your new knee is uncemented. ? · After your knee has healed enough, you can do more strenuous activities with caution. ¨ You can golf, but use a golf cart, and do not wear shoes with spikes. ¨ You can bike on a flat road or on a stationary bike. Avoid biking up hills. ¨ Your doctor may suggest that you stay away from activities that put stress on your knee. These include tennis or badminton, squash or racquetball, contact sports like football, jumping (such as in basketball), jogging, or running. ¨ Avoid activities where you might fall. These include horseback riding, skiing, and mountain biking. ? · Do not sit for more than 1 hour at a time. Get up and walk around for a while before you sit again. If you must sit for a long time, prop up your leg with a chair or footstool. This will help you avoid swelling. ? · Ask your doctor when you can shower. You may need to take sponge baths until your stitches or staples have been removed. ? · Ask your doctor when you can drive again. It may take up to 8 weeks after knee replacement surgery before it is safe for you to drive. ? · When you get into a car, sit on the edge of the seat. Then pull in your legs, and turn to face the front. ? · You should be able to do many everyday activities 3 to 6 weeks after your surgery. You will probably need to take 4 to 16 weeks off from work. When you can go back to work depends on the type of work you do and how you feel. ? · Ask your doctor when it is okay for you to have sex. ? · Do not lift anything heavier than 10 pounds and do not lift weights for 12 weeks. Diet ? · By the time you leave the hospital, you should be eating your normal diet. If your stomach is upset, try bland, low-fat foods like plain rice, broiled chicken, toast, and yogurt. Your doctor may suggest that you take iron and vitamin supplements. ? · Drink plenty of fluids (unless your doctor tells you not to). ? · Eat healthy foods, and watch your portion sizes. Try to stay at your ideal weight. Too much weight puts more stress on your new knee. ? · You may notice that your bowel movements are not regular right after your surgery. This is common. Try to avoid constipation and straining with bowel movements. You may want to take a fiber supplement every day. If you have not had a bowel movement after a couple of days, ask your doctor about taking a mild laxative. Medicines ? · Your doctor will tell you if and when you can restart your medicines. He or she will also give you instructions about taking any new medicines. ? · If you take blood thinners, such as warfarin (Coumadin), clopidogrel (Plavix), or aspirin, be sure to talk to your doctor. He or she will tell you if and when to start taking those medicines again. Make sure that you understand exactly what your doctor wants you to do.  
? · Your doctor may give you a blood-thinning medicine to prevent blood clots. If you take a blood thinner, be sure you get instructions about how to take your medicine safely. Blood thinners can cause serious bleeding problems. This medicine could be in pill form or as a shot (injection). If a shot is necessary, your doctor will tell you how to do this. ? · Be safe with medicines. Take pain medicines exactly as directed. ¨ If the doctor gave you a prescription medicine for pain, take it as prescribed. ¨ If you are not taking a prescription pain medicine, ask your doctor if you can take an over-the-counter medicine. ¨ Plan to take your pain medicine 30 minutes before exercises. It is easier to prevent pain before it starts than to stop it once it has started. ? · If you think your pain medicine is making you sick to your stomach: 
¨ Take your medicine after meals (unless your doctor has told you not to). ¨ Ask your doctor for a different pain medicine. ? · If your doctor prescribed antibiotics, take them as directed. Do not stop taking them just because you feel better. You need to take the full course of antibiotics. Incision care ? · You will have a bandage over the cut (incision) and staples or stitches. Take the bandage off when your doctor says it is okay. ? · Your doctor will remove the staples or stitches 10 days to 3 weeks after the surgery and replace them with strips of tape. Leave the tape on for a week or until it falls off. Exercise ? · Your rehab program will give you a number of exercises to do to help you get back your knee's range of motion and strength. Always do them as your therapist tells you. Ice and elevation ? · For pain and swelling, put ice or a cold pack on the area for 10 to 20 minutes at a time. Put a thin cloth between the ice and your skin. Other instructions ? · Continue to wear your support stockings as your doctor says. These help to prevent blood clots. The length of time that you will have to wear them depends on your activity level and the amount of swelling. ? · You have metal pieces in your knee. These may set off some airport metal detectors. Carry a medical alert card that says you have an artificial joint, just in case. Follow-up care is a key part of your treatment and safety. Be sure to make and go to all appointments, and call your doctor if you are having problems. It's also a good idea to know your test results and keep a list of the medicines you take. When should you call for help? Call 911 anytime you think you may need emergency care. For example, call if: 
? · You passed out (lost consciousness). ? · You have severe trouble breathing. ? · You have sudden chest pain and shortness of breath, or you cough up blood. ?Call your doctor now or seek immediate medical care if: 
? · You have signs of infection, such as: 
¨ Increased pain, swelling, warmth, or redness. ¨ Red streaks leading from the incision. ¨ Pus draining from the incision. ¨ A fever. ? · You have signs of a blood clot, such as: 
¨ Pain in your calf, back of the knee, thigh, or groin. ¨ Redness and swelling in your leg or groin. ? · Your incision comes open and begins to bleed, or the bleeding increases. ? · You have pain that does not get better after you take pain medicine. ? Watch closely for changes in your health, and be sure to contact your doctor if: 
? · You do not have a bowel movement after taking a laxative. Where can you learn more? Go to http://li-francisco.info/. Enter E745 in the search box to learn more about \"Total Knee Replacement: What to Expect at Home. \" Current as of: March 21, 2017 Content Version: 11.4 © 2971-3188 [employer]. Care instructions adapted under license by [employer] (which disclaims liability or warranty for this information). If you have questions about a medical condition or this instruction, always ask your healthcare professional. [address] any warranty or liability for your use of this information. These are general instructions for a healthy lifestyle: No smoking/ No tobacco products/ Avoid exposure to second hand smoke Surgeon General's Warning:  Quitting smoking now greatly reduces serious risk to your health. Obesity, smoking, and sedentary lifestyle greatly increases your risk for illness A healthy diet, regular physical exercise & weight monitoring are important for maintaining a healthy lifestyle You may be retaining fluid if you have a history of heart failure or if you experience any of the following symptoms:  Weight gain of 3 pounds or more overnight or 5 pounds in a week, increased swelling in our hands or feet or shortness of breath while lying flat in bed.   Please call your doctor as soon as you notice any of these symptoms; do not wait until your next office visit. Recognize signs and symptoms of STROKE: 
 
F-face looks uneven A-arms unable to move or move even S-speech slurred or non-existent T-time-call 911 as soon as signs and symptoms begin-DO NOT go Back to bed or wait to see if you get better-TIME IS BRAIN. The discharge information has been reviewed with the patient. The patient verbalized understanding. Information obtained by : 
I understand that if any problems occur once I am at home I am to contact my physician. I understand and acknowledge receipt of the instructions indicated above. Physician's or R.N.'s Signature                                                                  Date/Time Patient or Representative Signature                                                          Date/Time Introducing \[facility]\"" & HEALTH SERVICES! [employer] introduces [facility] patient portal. Now you can access parts of your medical record, email your doctor's office, and request medication refills online. 1. In your internet browser, go to [email or web address]. 2. Click on the First Time User? Click Here link in the Sign In box. You will see the New Member Sign Up page. 3. Enter your [facility] Access Code exactly as it appears below. You will not need to use this code after youve completed the sign-up process. If you do not sign up before the expiration date, you must request a new code. · [facility] Access Code: [email or web address] Expires: 6/19/2018 11:38 AM 
 
4. Enter the last four digits of your Social Security Number (xxxx) and Date of Birth (mm/dd/yyyy) as indicated and click Submit.  You will be taken to the next sign-up page. 5. Create a [employer] ID. This will be your [employer] login ID and cannot be changed, so think of one that is secure and easy to remember. 6. Create a [employer] password. You can change your password at any time. 7. Enter your Password Reset Question and Answer. This can be used at a later time if you forget your password. 8. Enter your e-mail address. You will receive e-mail notification when new information is available in [address] 9. Click Sign Up. You can now view and download portions of your medical record. 10. Click the Download Summary menu link to download a portable copy of your medical information. If you have questions, please visit the Frequently Asked Questions section of the [employer] website. Remember, [employer] is NOT to be used for urgent needs. For medical emergencies, dial 911. Now available from your iPhone and Android! Introducing Cecil Lima As a [facility] [employer] patient, I wanted to make you aware of our electronic visit tool called Cecil [facility]. [employer] 24/7 allows you to connect within minutes with a medical provider 24 hours a day, seven days a week via a mobile device or tablet or logging into a secure website from your computer. You can access Cecil Lima from anywhere in the [place]. A virtual visit might be right for you when you have a simple condition and feel like you just dont want to get out of bed, or cant get away from work for an appointment, when your regular [facility] provider is not available (evenings, weekends or holidays), or when youre out of town and need minor care. Electronic visits cost only $49 and if the [employer] 24/7 provider determines a prescription is needed to treat your condition, one can be electronically transmitted to a nearby pharmacy*. Please take a moment to enroll today if you have not already done so.   The enrollment process is free and takes just a few minutes. To enroll, please download the [employer] 24/7 rebel to your tablet or phone, or visit [email or web address] to enroll on your computer. And, as an [address] [place] [address] patient with a [employer] account, the results of your visits will be scanned into your electronic medical record and your primary care provider will be able to view the scanned results. We urge you to continue to see your regular [employer] provider for your ongoing medical care. And while your primary care provider may not be the one available when you seek a [employer] virtual visit, the peace of mind you get from getting a real diagnosis real time can be priceless. For more information on [employer], view our Frequently Asked Questions (FAQs) at [email or web address]. Sincerely, 
 
Price Chiang MD 
Chief Medical Officer [employer] *:  certain medications cannot be prescribed via [employer] Unresulted Labs-Please follow up with your PCP about these lab tests Order Current Status CULTURE, ANAEROBIC In process CULTURE, ANAEROBIC In process CULTURE, ANAEROBIC In process CULTURE, ANAEROBIC In process CULTURE, WOUND W GRAM STAIN Preliminary result CULTURE, WOUND W GRAM STAIN Preliminary result CULTURE, WOUND W GRAM STAIN Preliminary result CULTURE, WOUND W GRAM STAIN Preliminary result Providers Seen During Your Hospitalization Provider Specialty Primary office phone Marco Spence MD Orthopedic Surgery [phone] Immunizations Administered for This Admission Name Date  
 TB Skin Test (PPD) Intradermal  Deferred (),  Deferred (), 4/11/2018 Your Primary Care Physician (PCP) Primary Care Physician Office Phone Office Fax [phone] [address] Gonzalez [facility] [address] [facility] You are allergic to the following Allergen Reactions Codeine Nausea and Vomiting Other Medication Nausea and Vomiting Pain meds and sedatives cause nausea and vomiting. Phenergan (Promethazine) Other (comments) Pt states \"it makes me too sleepy to function\" Recent Documentation Height Weight Breastfeeding? BMI OB Status Smoking Status 1.727 m 87.1 kg No 29.19 kg/m2 Postmenopausal Former Smoker Emergency Contacts Name Discharge Info Relation Home Work Mobile Katiana Baker CAREGIVER [3] Son [22] [phone] Milly Calvillo  Other Relative [6] [phone] Patient Belongings The following personal items are in your possession at time of discharge: 
  Dental Appliances: Partials, Uppers, With patient  Visual Aid: Glasses   Hearing Aids/Status: Does not own  Home Medications: None   Jewelry: Ring, Earrings       Other Valuables: Cell Phone (all valuables with Hermila Edgar) Please provide this summary of care documentation to your next provider. Signatures-by signing, you are acknowledging that this After Visit Summary has been reviewed with you and you have received a copy. Patient Signature:  ____________________________________________________________ Date:  ____________________________________________________________  
  
Teresita Gaona Provider Signature:  ____________________________________________________________ Date:  ____________________________________________________________

## 2018-04-11 NOTE — ANESTHESIA PROCEDURE NOTES
Spinal Block    Start time: 4/11/2018 1:15 PM  End time: 4/11/2018 1:18 PM  Performed by: Karen Martinez  Authorized by: Karen Martinez     Pre-procedure:   Indications: at surgeon's request, post-op pain management, procedure for pain and primary anesthetic  Preanesthetic Checklist: patient identified, risks and benefits discussed, anesthesia consent, site marked, patient being monitored and timeout performed    Timeout Time: 13:15          Spinal Block:   Patient Position:  Seated  Prep Region:  Lumbar  Prep: chlorhexidine      Location:  L3-4  Technique:  Single shot  Local:  Lidocaine 1%      Needle:   Needle Type:  Pencan  Needle Gauge:  25 G  Attempts:  1      Events: CSF confirmed, no blood with aspiration and no paresthesia        Assessment:  Insertion:  Uncomplicated  Patient tolerance:  Patient tolerated the procedure well with no immediate complications  Bupiv 2.89% 1cc  Tetracaine 1% 1cc

## 2018-04-11 NOTE — ANESTHESIA PREPROCEDURE EVALUATION
Anesthetic History     PONV          Review of Systems / Medical History      Pulmonary                   Neuro/Psych         Psychiatric history     Cardiovascular    Hypertension              Exercise tolerance: >4 METS     GI/Hepatic/Renal                Endo/Other      Hypothyroidism: well controlled  Arthritis     Other Findings              Physical Exam    Airway  Mallampati: II  TM Distance: 4 - 6 cm  Neck ROM: normal range of motion   Mouth opening: Normal     Cardiovascular    Rhythm: regular  Rate: normal         Dental  No notable dental hx       Pulmonary  Breath sounds clear to auscultation               Abdominal  GI exam deferred       Other Findings            Anesthetic Plan    ASA: 2  Anesthesia type: spinal      Post-op pain plan if not by surgeon: peripheral nerve block single

## 2018-04-12 LAB
ANION GAP SERPL CALC-SCNC: 7 MMOL/L (ref 7–16)
BUN SERPL-MCNC: 21 MG/DL (ref 8–23)
CALCIUM SERPL-MCNC: 8.3 MG/DL (ref 8.3–10.4)
CHLORIDE SERPL-SCNC: 105 MMOL/L (ref 98–107)
CO2 SERPL-SCNC: 29 MMOL/L (ref 21–32)
CREAT SERPL-MCNC: 1.07 MG/DL (ref 0.6–1)
GLUCOSE SERPL-MCNC: 139 MG/DL (ref 65–100)
HGB BLD-MCNC: 11.5 G/DL (ref 11.7–15.4)
MM INDURATION POC: 0 MM (ref 0–5)
POTASSIUM SERPL-SCNC: 4.8 MMOL/L (ref 3.5–5.1)
PPD POC: NORMAL NEGATIVE
SODIUM SERPL-SCNC: 141 MMOL/L (ref 136–145)

## 2018-04-12 PROCEDURE — 80048 BASIC METABOLIC PNL TOTAL CA: CPT | Performed by: PHYSICIAN ASSISTANT

## 2018-04-12 PROCEDURE — 85018 HEMOGLOBIN: CPT | Performed by: PHYSICIAN ASSISTANT

## 2018-04-12 PROCEDURE — 65270000029 HC RM PRIVATE

## 2018-04-12 PROCEDURE — 97161 PT EVAL LOW COMPLEX 20 MIN: CPT

## 2018-04-12 PROCEDURE — 97165 OT EVAL LOW COMPLEX 30 MIN: CPT

## 2018-04-12 PROCEDURE — 94762 N-INVAS EAR/PLS OXIMTRY CONT: CPT

## 2018-04-12 PROCEDURE — 97150 GROUP THERAPEUTIC PROCEDURES: CPT

## 2018-04-12 PROCEDURE — 74011250636 HC RX REV CODE- 250/636: Performed by: PHYSICIAN ASSISTANT

## 2018-04-12 PROCEDURE — 94760 N-INVAS EAR/PLS OXIMETRY 1: CPT

## 2018-04-12 PROCEDURE — 97110 THERAPEUTIC EXERCISES: CPT

## 2018-04-12 PROCEDURE — 36415 COLL VENOUS BLD VENIPUNCTURE: CPT | Performed by: PHYSICIAN ASSISTANT

## 2018-04-12 PROCEDURE — 97535 SELF CARE MNGMENT TRAINING: CPT

## 2018-04-12 PROCEDURE — 97116 GAIT TRAINING THERAPY: CPT

## 2018-04-12 PROCEDURE — 74011250637 HC RX REV CODE- 250/637: Performed by: PHYSICIAN ASSISTANT

## 2018-04-12 RX ADMIN — ASPIRIN 81 MG: 81 TABLET, COATED ORAL at 20:09

## 2018-04-12 RX ADMIN — ONDANSETRON 4 MG: 2 INJECTION INTRAMUSCULAR; INTRAVENOUS at 10:08

## 2018-04-12 RX ADMIN — Medication 2 G: at 07:07

## 2018-04-12 RX ADMIN — ONDANSETRON 4 MG: 2 INJECTION INTRAMUSCULAR; INTRAVENOUS at 20:10

## 2018-04-12 RX ADMIN — HYDROMORPHONE HYDROCHLORIDE 2 MG: 2 TABLET ORAL at 04:16

## 2018-04-12 RX ADMIN — ACETAMINOPHEN 1000 MG: 500 TABLET, FILM COATED ORAL at 07:07

## 2018-04-12 RX ADMIN — ASPIRIN 81 MG: 81 TABLET, COATED ORAL at 10:11

## 2018-04-12 RX ADMIN — CELECOXIB 200 MG: 200 CAPSULE ORAL at 10:11

## 2018-04-12 RX ADMIN — SENNOSIDES AND DOCUSATE SODIUM 2 TABLET: 8.6; 5 TABLET ORAL at 10:11

## 2018-04-12 RX ADMIN — Medication 5 ML: at 12:30

## 2018-04-12 RX ADMIN — CELECOXIB 200 MG: 200 CAPSULE ORAL at 20:09

## 2018-04-12 RX ADMIN — HYDROMORPHONE HYDROCHLORIDE 2 MG: 2 TABLET ORAL at 10:11

## 2018-04-12 RX ADMIN — LEVOTHYROXINE SODIUM 88 MCG: 88 TABLET ORAL at 07:07

## 2018-04-12 RX ADMIN — LISINOPRIL 10 MG: 5 TABLET ORAL at 10:11

## 2018-04-12 RX ADMIN — HYDROMORPHONE HYDROCHLORIDE 2 MG: 2 TABLET ORAL at 20:09

## 2018-04-12 RX ADMIN — ONDANSETRON 4 MG: 2 INJECTION INTRAMUSCULAR; INTRAVENOUS at 04:16

## 2018-04-12 NOTE — PROGRESS NOTES
Dilaudid 1 mg po for c/o pain. Zofran 4mg given slow IVP for c/o nausea. Neurovascular status remains WDL.

## 2018-04-12 NOTE — PROGRESS NOTES
Problem: Self Care Deficits Care Plan (Adult)  Goal: *Acute Goals and Plan of Care (Insert Text)  GOALS:   DISCHARGE GOALS (in preparation for going home/rehab):  3 days  1. Ms. Xavier Arechiga will perform one lower body dressing activity with minimal assistance required to demonstrate improved functional mobility and safety. 2.  Ms. Xavier Arechiga will perform one lower body bathing activity with minimal assistance required to demonstrate improved functional mobility and safety. 3.  Ms. Xavier Arechiga will perform toileting/toilet transfer with contact guard assistance to demonstrate improved functional mobility and safety. 4.  Ms. Xavier Arechiga will perform shower transfer with contact guard assistance to demonstrate improved functional mobility and safety. JOINT CAMP OCCUPATIONAL THERAPY TKA: Initial Assessment 4/12/2018  INPATIENT: Hospital Day: 2  [place] / [place] / Product Type: Medicare /      NAME/AGE/GENDER: Sumi Witt is a 66 y.o. 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 Xavier Arechiga is s/p L TKA and presents with decreased weight bearing on L LE and decreased independence with functional mobility and activities of daily living. Patient would benefit from skilled Occupational Therapy to maximize independence and safety with self-care task and functional mobility. Pt would also benefit from education on adaptive equipment and safety precautions post-surgery in preparation for going home or for recommendations for post-hospital rehab program.  Patient plans for further rehab at home.   OT reviewed therapy schedule with patient for today as well as post-op day 2. Patient was able to stand and take steps to the recliner as charted below. Patient instructed to call for assistance when needing to get up from the bed and all needs in reach. Patient verbalized understanding and use of call light. This section established at most recent assessment   PROBLEM LIST (Impairments causing functional limitations):  1. Decreased Strength  2. Decreased ADL/Functional Activities  3. Decreased Transfer Abilities  4. Increased Pain  5. Increased Fatigue  6. Decreased Flexibility/Joint Mobility  7. Decreased Knowledge of Precautions   INTERVENTIONS PLANNED: (Benefits and precautions of occupational therapy have been discussed with the patient.)  1. Activities of daily living training  2. Adaptive equipment training  3. Balance training  4. Clothing management  5. Donning&doffing training  6. Theraputic activity     TREATMENT PLAN: Frequency/Duration: Follow patient qd to address above goals. Rehabilitation Potential For Stated Goals: Good     RECOMMENDED REHABILITATION/EQUIPMENT: (at time of discharge pending progress): Continue Skilled Therapy and Home Health: Physical Therapy. OCCUPATIONAL PROFILE AND HISTORY:   History of Present Injury/Illness (Reason for Referral): Pt presents this date s/p (L) TKA. Past Medical History/Comorbidities:   Ms. 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 Ms. Mary Lou Perez  has a past surgical history that includes hx mohs procedure (Right); pr thyroidectomy; and hx knee replacement (Left, 2012).   Social History/Living Environment:   Home Environment: Private residence  Support Systems: Family member(s)  Patient Expects to be Discharged to[de-identified] Other (comment) (transfer to or)  Prior Level of Function/Work/Activity:  independent   Number of Personal Factors/Comorbidities that affect the Plan of Care: Brief history (0):  LOW COMPLEXITY   ASSESSMENT OF OCCUPATIONAL PERFORMANCE[de-identified]   Most Recent Physical Functioning:   Balance  Sitting: Intact  Standing: Pull to stand; With support       Gross Assessment: Yes  Gross Assessment  AROM: Within functional limits (BUE)  Strength: Within functional limits (BUE)  Coordination: Within functional limits (BUE)  Tone: Normal  Sensation: Intact                 Vision  Acuity: Within Defined Limits    Mental Status  Neurologic State: Alert  Orientation Level: Oriented X4  Cognition: Follows commands  Perception: Appears intact  Perseveration: No perseveration noted  Safety/Judgement: Fall prevention; Awareness of environment                Basic ADLs (From Assessment) Complex ADLs (From Assessment)   Basic ADL  Feeding: Setup  Oral Facial Hygiene/Grooming: Setup  Bathing: Moderate assistance  Upper Body Dressing: Setup  Lower Body Dressing: Maximum assistance  Toileting: Contact guard assistance     Grooming/Bathing/Dressing Activities of Daily Living     Cognitive Retraining  Safety/Judgement: Fall prevention; Awareness of environment                 Functional Transfers  Toilet Transfer : Minimum assistance  Shower Transfer: Minimum assistance     Bed/Mat Mobility  Supine to Sit: Minimum assistance  Sit to Supine: Minimum assistance  Sit to Stand: Minimum assistance  Bed to Chair: Minimum assistance         Physical Skills Involved:  1. Balance  2. Strength  3. Activity Tolerance Cognitive Skills Affected (resulting in the inability to perform in a timely and safe manner): 1. none Psychosocial Skills Affected:  1. none   Number of elements that affect the Plan of Care: 1-3:  LOW COMPLEXITY   CLINICAL DECISION MAKIN [facility] [address] [id number] AM-PAC 6 Clicks   Daily Activity Inpatient Short Form  How much help from another person does the patient currently need. ..  Total A Lot A Little None   1. Putting on and taking off regular lower body clothing? [] 1   [x] 2   [] 3   [] 4   2. Bathing (including washing, rinsing, drying)? [] 1   [x] 2   [] 3   [] 4   3. Toileting, which includes using toilet, bedpan or urinal?   [] 1   [x] 2   [] 3   [] 4   4. Putting on and taking off regular upper body clothing? [] 1   [] 2   [x] 3   [] 4   5. Taking care of personal grooming such as brushing teeth? [] 1   [] 2   [x] 3   [] 4   6. Eating meals? [] 1   [] 2   [x] 3   [] 4   © 2007, Trustees of [address] [place] [address] [address] [address], under license to [employer]. All rights reserved     Score:  Initial: 15 Most Recent: X (Date: -- )    Interpretation of Tool:  Represents activities that are increasingly more difficult (i.e. Bed mobility, Transfers, Gait). Score 24 23 22-20 19-15 14-10 9-7 6     Modifier CH CI CJ CK CL CM CN      ? Self Care:     - CURRENT STATUS: CK - 40%-59% impaired, limited or restricted    - GOAL STATUS: CJ - 20%-39% impaired, limited or restricted    - D/C STATUS:  ---------------To be determined---------------  [place] / [place] / Product Type: Medicare /      Medical Necessity:     · Patient is expected to demonstrate progress in strength, balance, coordination and functional technique to increase independence with self care and functional mobility. Reason for Services/Other Comments:  · Patient continues to require skilled intervention due to decrease self care and functional mobility. Use of outcome tool(s) and clinical judgement create a POC that gives a: LOW COMPLEXITY            TREATMENT:   (In addition to Assessment/Re-Assessment sessions the following treatments were rendered)     Pre-treatment Symptoms/Complaints:   Tolerated sitting up in chair  Pain: Initial:   Pain Intensity 1: 4  Pain Location 1: Knee  Pain Orientation 1: Left  Pain Intervention(s) 1: Repositioned  Post Session:  4     Assessment/Reassessment only, no treatment provided today    Treatment/Session Assessment:     Response to Treatment:  Tolerated well    Education:  [] Home Exercises  [x] Fall Precautions  [] Hip Precautions [] Going Home Video  [x] Knee/Hip Prosthesis Review  [x] Walker Management/Safety [x] Adaptive Equipment as Needed       Interdisciplinary Collaboration:   o Physical Therapist  o Occupational Therapist  o Registered Nurse    After treatment position/precautions:   o Up in chair  o Bed/Chair-wheels locked  o Caregiver at bedside  o Call light within reach  o RN notified  o LEs reclined     Compliance with Program/Exercises: compliant all of the time. Recommendations/Intent for next treatment session:  Treatment next visit will focus on increasing Ms. Maryellen Ireland independence with bed mobility, transfers, self care, functional mobility, modalities for pain, and patient education.       Total Treatment Duration:  OT Patient Time In/Time Out  Time In: 0900  Time Out: [address]

## 2018-04-12 NOTE — PROGRESS NOTES
04/11/18 2018   Oxygen Therapy   O2 Sat (%) 97 %   Pulse via Oximetry 68 beats per minute   O2 Device Room air   CS 6 set up.

## 2018-04-12 NOTE — PROGRESS NOTES
Dilaudid 2 mg po for c/o pain. Zofran 4mg given slow IVP for c/o nausea. No change in NV status noted.

## 2018-04-12 NOTE — PROGRESS NOTES
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 ([address] [T84.013A]  Status post left knee replacement [Z96.652]    LAB:    Recent Results (from the past 24 hour(s))   PTT    Collection Time: 18  9:08 AM   Result Value Ref Range    aPTT 32.7 23.2 - 35.3 SEC   TYPE & SCREEN    Collection Time: 18  9:08 AM   Result Value Ref Range    Crossmatch Expiration 2018     ABO/Rh(D) Gail Roland POSITIVE     Antibody screen NEG    GLUCOSE, POC    Collection Time: 18  9:17 AM   Result Value Ref Range    Glucose (POC) 85 65 - 100 mg/dL   HEMOGLOBIN    Collection Time: 18  7:29 PM   Result Value Ref Range    HGB 12.8 11.7 - 15.4 g/dL   HEMOGLOBIN    Collection Time: 18  5:08 AM   Result Value Ref Range    HGB 11.5 (L) 11.7 - 83.5 g/dL   METABOLIC PANEL, BASIC    Collection Time: 18  5:08 AM   Result Value Ref Range    Sodium 141 136 - 145 mmol/L    Potassium 4.8 3.5 - 5.1 mmol/L    Chloride 105 98 - 107 mmol/L    CO2 29 21 - 32 mmol/L    Anion gap 7 7 - 16 mmol/L    Glucose 139 (H) 65 - 100 mg/dL    BUN 21 8 - 23 MG/DL    Creatinine 1.07 (H) 0.6 - 1.0 MG/DL    GFR est AA >60 >60 ml/min/1.73m2    GFR est non-AA 53 (L) >60 ml/min/1.73m2    Calcium 8.3 8.3 - 10.4 MG/DL     Vital Signs:    Patient Vitals for the past 8 hrs:   BP Temp Pulse Resp SpO2   18 0454 (!) 145/96 98.1 °F (36.7 °C) 69 16 95 %   18 0016 140/80 97.3 °F (36.3 °C) 68 16 97 %     Temp (24hrs), Av.4 °F (36.3 °C), Min:97.1 °F (36.2 °C), Max:98.1 °F (36.7 °C)    Body mass index is 29.19 kg/(m^2).   Pain Control:   Pain Assessment  Pain Scale 1: FLACC  Pain Intensity 1: 0  Pain Onset 1: years   Pain Location 1: Knee  Pain Orientation 1: Left  Pain Description 1: Aching    Subjective: Doing well, No complaints, No SOB, No Chest Pain, No Nausea or Vomitting     Objective: Vital Signs are Stable, No Acute Distress, Alert and Oriented, Dressing is Dry,  Neurovascular exam is normal.       PT/OT:            Assistive Device: Walker (comment)                Weight Bearing Status: WBAT    Meds:  [unfilled]  [unfilled]  [unfilled]    Assessment:   Patient Active Problem List   Diagnosis Code    Uncontrolled hypertension I10    Anxiety F41.9    Depression F32.9    Hypothyroidism E03.9    DJD (degenerative joint disease) of knee M17.10    Elevated blood pressure reading R03.0    Status post revision of total knee replacement, left S89.82             Plan: Continue Physical Therapy, Monitor  Hbg, home tomorrow. Follow cultures.         Signed By: Digna Gonzalez., MD

## 2018-04-12 NOTE — PROGRESS NOTES
In recliner. Family in room. Ace bandage dry and intact to L LE with HVAC charged. Iceman to knee. Medicated with zofran IV to prevent nausea with pain medication. Medicated for pain with dilaudid po.

## 2018-04-12 NOTE — PROGRESS NOTES
Problem: Mobility Impaired (Adult and Pediatric)  Goal: *Acute Goals and Plan of Care (Insert Text)  GOALS (1-4 days):  (1.)Ms. Fletcher Baker will move from supine to sit and sit to supine  in bed with STAND BY ASSIST.  (2.)Ms. Fletcher Baker will transfer from bed to chair and chair to bed with STAND BY ASSIST using the least restrictive device. (3.)Ms. Calvillo will ambulate with STAND BY ASSIST for 100 feet with the least restrictive device. (4.)Ms. Calvillo will ambulate up/down 3 steps with bilateral  railing with CONTACT GUARD ASSIST with no device. (5.)Ms. Calvillo will increase left knee ROM to 5°-80°.   ________________________________________________________________________________________________    PHYSICAL THERAPY [facility]: Daily Note, PM 4/12/2018  INPATIENT: Hospital Day: 2  [place] / Product Type: Medicare /      NAME/AGE/GENDER: Alex Burks is a 66 y.o. 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 Fletcher Baker presents with limited ROM, strength and functional mobility. She will benefit from PT to increase her independence with gait and transfers. Progressed slowly with gait distance this afternoon. Needs encouragement to push herself. She understands the need to move with therapy and says that she will. This section established at most recent assessment   PROBLEM LIST (Impairments causing functional limitations):  1. Decreased Strength  2. Decreased Transfer Abilities  3. Decreased Ambulation Ability/Technique  4.  Decreased Balance  5. Increased Pain  6. Decreased Flexibility/Joint Mobility   INTERVENTIONS PLANNED: (Benefits and precautions of physical therapy have been discussed with the patient.)  1. Bed Mobility  2. Cold  3. Gait Training  4. Home Exercise Program (HEP)  5. Therapeutic Exercise/Strengthening  6. Transfer Training  7. Range of Motion: active/assisted/passive  8. Therapeutic Activities  9. Group Therapy     TREATMENT PLAN: Frequency/Duration: Follow patient BID for duration of hospital stay to address above goals. Rehabilitation Potential For Stated Goals: Excellent     RECOMMENDED REHABILITATION/EQUIPMENT: (at time of discharge pending progress): Continue Skilled Therapy and Home Health: Physical Therapy. HISTORY:   History of Present Injury/Illness (Reason for Referral): Admitted for L TKA revision. Her original TKA was in 2012  Past Medical History/Comorbidities:   Ms. 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 Ms. Xavier Arechiga  has a past surgical history that includes hx mohs procedure (Right); pr thyroidectomy; and hx knee replacement (Left, 2012). Social History/Living Environment:   Home Environment: Private residence  # Steps to Enter: 2  Support Systems: Family member(s)  Patient Expects to be Discharged to[de-identified] Other (comment) (transfer to or)  Current DME Used/Available at Home: kori Barton  Prior Level of Function/Work/Activity:  Independent prior to admit. Using a rollator for mobility   Number of Personal Factors/Comorbidities that affect the Plan of Care: 1-2: MODERATE COMPLEXITY   EXAMINATION:   Most Recent Physical Functioning:   Gross Assessment: Yes  Gross Assessment  AROM: Within functional limits (limited L LE)  Strength:  Within functional limits (limited L LE)  Coordination: Within functional limits (BUE)  Tone: Normal  Sensation: Intact          LLE AROM  L Knee Flexion: 52  L Knee Extension: 4          Bed Mobility  Supine to Sit:  (left up in chair)  Sit to Supine:  (left up in chair)    Transfers  Sit to Stand: Minimum assistance  Stand to Sit: Contact guard assistance  Bed to Chair: Contact guard assistance    Balance  Sitting: Intact  Standing: Pull to stand; With support    Posture  Posture (WDL): Within defined limits         Weight Bearing Status  Left Side Weight Bearing: As tolerated  Distance (ft): 25 Feet (ft)  Ambulation - Level of Assistance: Minimal assistance  Assistive Device: Walker, rolling  Speed/Vibha: Pace decreased (<100 feet/min)  Step Length: Right shortened  Stance: Left decreased  Gait Abnormalities: Antalgic; Step to gait  Interventions: Verbal cues     Braces/Orthotics: none    Left Knee Cold  Type: Cryocuff      Body Structures Involved:  1. Joints  2. Muscles Body Functions Affected:  1. Movement Related Activities and Participation Affected:  1. Mobility   Number of elements that affect the Plan of Care: 4+: HIGH COMPLEXITY   CLINICAL PRESENTATION:   Presentation: Stable and uncomplicated: LOW COMPLEXITY   CLINICAL DECISION MAKIN[address] [place], [place] [id number] AM-PAC 6 Clicks   Basic Mobility Inpatient Short Form  How much difficulty does the patient currently have. .. Unable A Lot A Little None   1. Turning over in bed (including adjusting bedclothes, sheets and blankets)? [] 1   [] 2   [x] 3   [] 4   2. Sitting down on and standing up from a chair with arms ( e.g., wheelchair, bedside commode, etc.)   [] 1   [] 2   [x] 3   [] 4   3. Moving from lying on back to sitting on the side of the bed? [] 1   [] 2   [x] 3   [] 4   How much help from another person does the patient currently need. .. Total A Lot A Little None   4. Moving to and from a bed to a chair (including a wheelchair)? [] 1   [] 2   [x] 3   [] 4   5. Need to walk in hospital room?    [] 1   [] 2 [x] 3   [] 4   6. Climbing 3-5 steps with a railing? [] 1   [] 2   [x] 3   [] 4   © 2007, Trustees of [address] [place] [address] [address] [address], under license to [employer]. All rights reserved        Score:  Initial: 18 Most Recent: X (Date: -- )    Interpretation of Tool:  Represents activities that are increasingly more difficult (i.e. Bed mobility, Transfers, Gait). Score 24 23 22-20 19-15 14-10 9-7 6     Modifier CH CI CJ CK CL CM CN      ? Mobility - Walking and Moving Around:     - CURRENT STATUS: CK - 40%-59% impaired, limited or restricted    - GOAL STATUS: CJ - 20%-39% impaired, limited or restricted    - D/C STATUS:  ---------------To be determined---------------  [place] / Product Type: Medicare /      Medical Necessity:     · Patient is expected to demonstrate progress in strength and range of motion to decrease assistance required with functional mobility. Reason for Services/Other Comments:  · Patient continues to require skilled intervention due to limited functional independence. Use of outcome tool(s) and clinical judgement create a POC that gives a: Clear prediction of patient's progress: LOW COMPLEXITY            TREATMENT:   (In addition to Assessment/Re-Assessment sessions the following treatments were rendered)     Pre-treatment Symptoms/Complaints:  Painful L inner thigh  Pain: Initial: 6     Post Session:  6     Therapeutic Exercise: (15 minutes):  Exercises per grid below to improve mobility and strength. Required minimal verbal cues to promote proper body alignment. Gait Training (10 Minutes):  Gait training to improve and/or restore physical functioning as related to mobility and strength. Ambulated 25 Feet (ft) with Minimal assistance using a Walker, rolling and minimal Verbal cues related to their stance phase to promote proper body alignment.      Date:  4/12 Date:   Date:     ACTIVITY/EXERCISE AM PM AM PM AM PM   GROUP THERAPY  []  [x] []  []  []  []   Ankle Pumps 10 15       Quad Sets 10 15       Gluteal Sets 10 15       Hip ABd/ADduction 10 15       Straight Leg Raises 10 15       Knee Slides 10 15       Short Arc Quads  15       Long Arc Quads         Chair Slides  15                B = bilateral; AA = active assistive; A = active; P = passive      Treatment/Session Assessment:     Response to Treatment:  Did well with therapy but hurting a lot and needed encouragement. Education:  [x] Home Exercises  [x] Fall Precautions  [] Hip Precautions [x] D/C Instruction Review  [x] Knee/Hip Prosthesis Review  [x] Walker Management/Safety [] Adaptive Equipment as Needed       Interdisciplinary Collaboration:   o Physical Therapist  o Registered Nurse  o Rehabilitation Attendant    After treatment position/precautions:   o Up in chair  o Bed/Chair-wheels locked  o Call light within reach  o RN notified  o Family at bedside    Compliance with Program/Exercises: compliant most of the time. Recommendations/Intent for next treatment session:  Treatment next visit will focus on increasing Ms. Danae Mandujano independence with bed mobility, transfers, gait training, strength/ROM exercises, modalities for pain, and patient education.       Total Treatment Duration:  PT Patient Time In/Time Out  Time In: 1300  Time Out: 1900 [email or web address], PT

## 2018-04-12 NOTE — PROGRESS NOTES
Problem: Self Care Deficits Care Plan (Adult)  Goal: *Acute Goals and Plan of Care (Insert Text)  GOALS:   DISCHARGE GOALS (in preparation for going home/rehab):  3 days  1. Ms. Fercho Mendoza will perform one lower body dressing activity with minimal assistance required to demonstrate improved functional mobility and safety. 2.  Ms. Fercho Mendoza will perform one lower body bathing activity with minimal assistance required to demonstrate improved functional mobility and safety. 3.  Ms. Fercho Mendoza will perform toileting/toilet transfer with contact guard assistance to demonstrate improved functional mobility and safety. 4.  Ms. Fercho Mendoza will perform shower transfer with contact guard assistance to demonstrate improved functional mobility and safety. JOINT CAMP OCCUPATIONAL THERAPY TKA: Daily Note, Treatment Day: 1st and AM 4/12/2018  INPATIENT: Hospital Day: 2  [place] / Product Type: Medicare /      NAME/AGE/GENDER: James Fink is a 66 y.o. 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 Fercho Mendoza  is s/p L TKA and presents with decreased weight bearing on L LE and decreased independence with functional mobility and activities of daily living. Patient completed partial sponge bath and dressing as charter below in ADL grid and is ambulating with rolling walker and minimal assist.  Patient has met 0/4 goals and plans to return home with good family support. Family able to provide patient with appropriate level of assistance at this time.   OT reviewed safety precautions throughout session and therapy schedule for the remainder of today. Patient instructed to call for assistance when needing to get up from recliner and all needs in reach. Patient verbalized understanding of call light. This section established at most recent assessment   PROBLEM LIST (Impairments causing functional limitations):  1. Decreased Strength  2. Decreased ADL/Functional Activities  3. Decreased Transfer Abilities  4. Increased Pain  5. Increased Fatigue  6. Decreased Flexibility/Joint Mobility  7. Decreased Knowledge of Precautions   INTERVENTIONS PLANNED: (Benefits and precautions of occupational therapy have been discussed with the patient.)  1. Activities of daily living training  2. Adaptive equipment training  3. Balance training  4. Clothing management  5. Donning&doffing training  6. Theraputic activity     TREATMENT PLAN: Frequency/Duration: Follow patient qd to address above goals. Rehabilitation Potential For Stated Goals: Good     RECOMMENDED REHABILITATION/EQUIPMENT: (at time of discharge pending progress): Continue Skilled Therapy and Home Health: Physical Therapy. OCCUPATIONAL PROFILE AND HISTORY:   History of Present Injury/Illness (Reason for Referral): Pt presents this date s/p (L) TKA. Past Medical History/Comorbidities:   Ms. 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 Ms. Tony Adams  has a past surgical history that includes hx mohs procedure (Right); pr thyroidectomy; and hx knee replacement (Left, 2012).   Social History/Living Environment:   Home Environment: Private residence  Support Systems: Family member(s)  Patient Expects to be Discharged to[de-identified] Other (comment) (transfer to or)  Prior Level of Function/Work/Activity:  independent   Number of Personal Factors/Comorbidities that affect the Plan of Care: Brief history (0):  LOW COMPLEXITY   ASSESSMENT OF OCCUPATIONAL PERFORMANCE[de-identified]   Most Recent Physical Functioning:   Balance  Sitting: Intact  Standing: Pull to stand; With support       Gross Assessment: Yes  Gross Assessment  AROM: Within functional limits (BUE)  Strength: Within functional limits (BUE)  Coordination: Within functional limits (BUE)  Tone: Normal  Sensation: Intact                 Vision  Acuity: Within Defined Limits    Mental Status  Neurologic State: Alert  Orientation Level: Oriented X4  Cognition: Follows commands  Perception: Appears intact  Perseveration: No perseveration noted  Safety/Judgement: Awareness of environment; Fall prevention                Basic ADLs (From Assessment) Complex ADLs (From Assessment)   Basic ADL  Feeding: Setup  Oral Facial Hygiene/Grooming: Setup  Bathing: Moderate assistance  Type of Bath: Chlorhexidine (CHG), Bath Pack, Partial  Upper Body Dressing: Setup  Lower Body Dressing: Maximum assistance  Toileting: Contact guard assistance     Grooming/Bathing/Dressing Activities of Daily Living   Grooming  Grooming Assistance: Supervision/set up  Washing Hands: Supervision/set-up Cognitive Retraining  Safety/Judgement: Awareness of environment; Fall prevention   Upper Body Bathing  Bathing Assistance: Supervision/set-up  Position Performed: Seated in chair  Cues: Verbal cues provided     Lower Body Bathing  Bathing Assistance: Maximum assistance     Upper Body Dressing Assistance  Dressing Assistance: [address]:  (night gown) Functional Transfers  Toilet Transfer : Minimum assistance  Shower Transfer: Minimum assistance   [address]: Total assistance(dependent)  Socks:  Total assistance (dependent) ( socks) Bed/Mat Mobility  Supine to Sit: Minimum assistance  Sit to Supine: Minimum assistance  Sit to Stand: Minimum assistance  Bed to Chair: Minimum assistance         Physical Skills Involved:  1. Balance  2. Strength  3. Activity Tolerance Cognitive Skills Affected (resulting in the inability to perform in a timely and safe manner): 1. none Psychosocial Skills Affected:  1. none   Number of elements that affect the Plan of Care: 1-3:  LOW COMPLEXITY   CLINICAL DECISION MAKIN[address] [place] [address] AM-PAC 6 Clicks   Daily Activity Inpatient Short Form  How much help from another person does the patient currently need. .. Total A Lot A Little None   1. Putting on and taking off regular lower body clothing? [] 1   [x] 2   [] 3   [] 4   2. Bathing (including washing, rinsing, drying)? [] 1   [x] 2   [] 3   [] 4   3. Toileting, which includes using toilet, bedpan or urinal?   [] 1   [x] 2   [] 3   [] 4   4. Putting on and taking off regular upper body clothing? [] 1   [] 2   [x] 3   [] 4   5. Taking care of personal grooming such as brushing teeth? [] 1   [] 2   [x] 3   [] 4   6. Eating meals? [] 1   [] 2   [x] 3   [] 4   © , Trustees of [address] [place] [address], under license to [employer]. All rights reserved     Score:  Initial: 15 Most Recent: X (Date: -- )    Interpretation of Tool:  Represents activities that are increasingly more difficult (i.e. Bed mobility, Transfers, Gait). Score 24 23 22-20 19-15 14-10 9-7 6     Modifier CH CI CJ CK CL CM CN      ? Self Care:     - CURRENT STATUS: CK - 40%-59% impaired, limited or restricted    - GOAL STATUS: CJ - 20%-39% impaired, limited or restricted    - D/C STATUS:  ---------------To be determined---------------  [place] / Product Type: Medicare /      Medical Necessity:     · Patient is expected to demonstrate progress in strength, balance, coordination and functional technique to increase independence with self care and functional mobility.   Reason for Services/Other Comments:  · Patient continues to require skilled intervention due to decrease self care and functional mobility. Use of outcome tool(s) and clinical judgement create a POC that gives a: LOW COMPLEXITY            TREATMENT:   (In addition to Assessment/Re-Assessment sessions the following treatments were rendered)     Pre-treatment Symptoms/Complaints: Tolerated sitting up in chair  Pain: Initial:   Pain Intensity 1: 6  Pain Location 1: Knee  Pain Orientation 1: Left  Pain Intervention(s) 1: Repositioned  Post Session:  4     Self Care: (30): Procedure(s) (per grid) utilized to improve and/or restore self-care/home management as related to dressing, bathing and grooming. Required minimal verbal and   cueing to facilitate activities of daily living skills and compensatory activities. Treatment/Session Assessment:     Response to Treatment:  Tolerated well    Education:  [] Home Exercises  [x] Fall Precautions  [] Hip Precautions [] Going Home Video  [x] Knee/Hip Prosthesis Review  [x] Walker Management/Safety [x] Adaptive Equipment as Needed       Interdisciplinary Collaboration:   o Occupational Therapist  o Registered Nurse    After treatment position/precautions:   o Up in chair  o Bed/Chair-wheels locked  o Caregiver at bedside  o Call light within reach  o RN notified  o LEs reclined     Compliance with Program/Exercises: compliant all of the time. Recommendations/Intent for next treatment session:  Treatment next visit will focus on increasing Ms. Aida Alpers independence with bed mobility, transfers, self care, functional mobility, modalities for pain, and patient education.       Total Treatment Duration:  OT Patient Time In/Time Out  Time In: 1010  Time Out: 999 [facility]

## 2018-04-12 NOTE — PROGRESS NOTES
Was feeling shaky at lunch time and didn't want any tylenol or any other medication. Had therapy and says she did better this afternoon than this morning. Has been up to BR voiding with assistance. HVAC charged with scant drainage. Ace bandage remains dry and intact. Back in bed and had nap after therapy. No longer feels shaky. NV checks remain WDL. Sitting up in bed eating. Son at bedside.

## 2018-04-13 ENCOUNTER — HOME HEALTH ADMISSION (OUTPATIENT)
Dept: HOME HEALTH SERVICES | Facility: HOME HEALTH | Age: 78
End: 2018-04-13
Payer: MEDICARE

## 2018-04-13 VITALS
SYSTOLIC BLOOD PRESSURE: 130 MMHG | OXYGEN SATURATION: 97 % | BODY MASS INDEX: 29.1 KG/M2 | DIASTOLIC BLOOD PRESSURE: 81 MMHG | WEIGHT: 192 LBS | RESPIRATION RATE: 18 BRPM | HEIGHT: 68 IN | TEMPERATURE: 97.9 F | HEART RATE: 67 BPM

## 2018-04-13 LAB
HGB BLD-MCNC: 11 G/DL (ref 11.7–15.4)
MM INDURATION POC: 0 MM (ref 0–5)
PPD POC: NORMAL NEGATIVE

## 2018-04-13 PROCEDURE — 36415 COLL VENOUS BLD VENIPUNCTURE: CPT | Performed by: PHYSICIAN ASSISTANT

## 2018-04-13 PROCEDURE — 97116 GAIT TRAINING THERAPY: CPT

## 2018-04-13 PROCEDURE — 85018 HEMOGLOBIN: CPT | Performed by: PHYSICIAN ASSISTANT

## 2018-04-13 PROCEDURE — 94760 N-INVAS EAR/PLS OXIMETRY 1: CPT

## 2018-04-13 PROCEDURE — 74011250637 HC RX REV CODE- 250/637: Performed by: PHYSICIAN ASSISTANT

## 2018-04-13 PROCEDURE — 97150 GROUP THERAPEUTIC PROCEDURES: CPT

## 2018-04-13 RX ADMIN — LEVOTHYROXINE SODIUM 88 MCG: 88 TABLET ORAL at 05:50

## 2018-04-13 RX ADMIN — LISINOPRIL 10 MG: 5 TABLET ORAL at 09:11

## 2018-04-13 RX ADMIN — SENNOSIDES AND DOCUSATE SODIUM 2 TABLET: 8.6; 5 TABLET ORAL at 09:11

## 2018-04-13 RX ADMIN — ACETAMINOPHEN 1000 MG: 500 TABLET, FILM COATED ORAL at 05:50

## 2018-04-13 RX ADMIN — ASPIRIN 81 MG: 81 TABLET, COATED ORAL at 09:11

## 2018-04-13 RX ADMIN — ACETAMINOPHEN 1000 MG: 500 TABLET, FILM COATED ORAL at 14:40

## 2018-04-13 RX ADMIN — CELECOXIB 200 MG: 200 CAPSULE ORAL at 09:11

## 2018-04-13 NOTE — PROGRESS NOTES
Problem: Mobility Impaired (Adult and Pediatric)  Goal: *Acute Goals and Plan of Care (Insert Text)  GOALS (1-4 days):  (1.)Ms. Joselyn Shah will move from supine to sit and sit to supine  in bed with STAND BY ASSIST.  (2.)Ms. Joselyn Shah will transfer from bed to chair and chair to bed with STAND BY ASSIST using the least restrictive device. (3.)Ms. Calvillo will ambulate with STAND BY ASSIST for 100 feet with the least restrictive device. (4.)Ms. Calvillo will ambulate up/down 3 steps with bilateral  railing with CONTACT GUARD ASSIST with no device. (5.)Ms. Calvillo will increase left knee ROM to 5°-80°.   ________________________________________________________________________________________________    PHYSICAL THERAPY Joint camp tKa: Daily Note, AM 4/13/2018  INPATIENT: Hospital Day: 3  [place] / Product Type: Medicare /      NAME/AGE/GENDER: Melvin Álvarez is a 66 y.o. 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 Joselyn Shah was sitting up in chair on arrival and plans to go home today with HHPT. Pt ambulated to the door and states her knee is burning and requests to sit down. Pt brought to gym and perform exercises with assist for a couple. Pt ambulated up and down rehab stairs with CGA and B handrails. Pt encouraged to walk back to room. After walking about 25 feet, she requested to sit down in chair. Pt encouraged to continue walking and walked about 50 more feet before sitting.   Talked with pt about importance of progressing with gait distance and pushing through the pain. Encouraged pt to stay for afternoon PT session, but she states her grandson will be here around noon to get her and \"I know what to do when I get home. \"  Pt left sitting up in chair with needs in reach. This section established at most recent assessment   PROBLEM LIST (Impairments causing functional limitations):  1. Decreased Strength  2. Decreased Transfer Abilities  3. Decreased Ambulation Ability/Technique  4. Decreased Balance  5. Increased Pain  6. Decreased Flexibility/Joint Mobility   INTERVENTIONS PLANNED: (Benefits and precautions of physical therapy have been discussed with the patient.)  1. Bed Mobility  2. Cold  3. Gait Training  4. Home Exercise Program (HEP)  5. Therapeutic Exercise/Strengthening  6. Transfer Training  7. Range of Motion: active/assisted/passive  8. Therapeutic Activities  9. Group Therapy     TREATMENT PLAN: Frequency/Duration: Follow patient BID for duration of hospital stay to address above goals. Rehabilitation Potential For Stated Goals: Excellent     RECOMMENDED REHABILITATION/EQUIPMENT: (at time of discharge pending progress): Continue Skilled Therapy and Home Health: Physical Therapy. HISTORY:   History of Present Injury/Illness (Reason for Referral): Admitted for L TKA revision. Her original TKA was in 2012  Past Medical History/Comorbidities:   Ms. 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 Ms. Bertha Silva  has a past surgical history that includes hx mohs procedure (Right); pr thyroidectomy; and hx knee replacement (Left, 2012).   Social History/Living Environment:   Home Environment: Private residence  # Steps to Enter: 2  One/Two Story Residence: One story  Living Alone: Yes  Support Systems: Family member(s)  Patient Expects to be Discharged to[de-identified] Private residence  Current DME Used/Available at Home: [employer]  Prior Level of Function/Work/Activity:  Independent prior to admit. Using a rollator for mobility   Number of Personal Factors/Comorbidities that affect the Plan of Care: 1-2: MODERATE COMPLEXITY   EXAMINATION:   Most Recent Physical Functioning:                 LLE AROM  L Knee Flexion: 61  L Knee Extension: 0               Transfers  Sit to Stand: Contact guard assistance  Stand to Sit: Contact guard assistance  Bed to Chair: Contact guard assistance                   Weight Bearing Status  Left Side Weight Bearing: As tolerated  Distance (ft): 70 Feet (ft)  Ambulation - Level of Assistance: Stand-by assistance  Assistive Device: Walker, rolling  Speed/Vibha: Delayed;Pace decreased (<100 feet/min)  Step Length: Left shortened;Right shortened  Stance: Left decreased  Gait Abnormalities: Antalgic;Decreased step clearance  Interventions: Safety awareness training;Verbal cues     Braces/Orthotics: none    Left Knee Cold  Type: Cryocuff      Body Structures Involved:  1. Joints  2. Muscles Body Functions Affected:  1. Movement Related Activities and Participation Affected:  1. Mobility   Number of elements that affect the Plan of Care: 4+: HIGH COMPLEXITY   CLINICAL PRESENTATION:   Presentation: Stable and uncomplicated: LOW COMPLEXITY   CLINICAL DECISION MAKIN [facility] [address] [id number] AM-PAC 6 Clicks   Basic Mobility Inpatient Short Form  How much difficulty does the patient currently have. .. Unable A Lot A Little None   1. Turning over in bed (including adjusting bedclothes, sheets and blankets)? [] 1   [] 2   [x] 3   [] 4   2. Sitting down on and standing up from a chair with arms ( e.g., wheelchair, bedside commode, etc.)   [] 1   [] 2   [x] 3   [] 4   3. Moving from lying on back to sitting on the side of the bed?    [] 1   [] 2   [x] 3   [] 4   How much help from another person does the patient currently need... Total A Lot A Little None   4. Moving to and from a bed to a chair (including a wheelchair)? [] 1   [] 2   [x] 3   [] 4   5. Need to walk in hospital room? [] 1   [] 2   [x] 3   [] 4   6. Climbing 3-5 steps with a railing? [] 1   [] 2   [x] 3   [] 4   © 2007, Trustees of [address] [place] [address] [address] [id number], under license to [employer]. All rights reserved        Score:  Initial: 18 Most Recent: X (Date: -- )    Interpretation of Tool:  Represents activities that are increasingly more difficult (i.e. Bed mobility, Transfers, Gait). Score 24 23 22-20 19-15 14-10 9-7 6     Modifier CH CI CJ CK CL CM CN      ? Mobility - Walking and Moving Around:     - CURRENT STATUS: CK - 40%-59% impaired, limited or restricted    - GOAL STATUS: CJ - 20%-39% impaired, limited or restricted    - D/C STATUS:  ---------------To be determined---------------  [place] / [place] / Product Type: Medicare /      Medical Necessity:     · Patient is expected to demonstrate progress in strength and range of motion to decrease assistance required with functional mobility. Reason for Services/Other Comments:  · Patient continues to require skilled intervention due to limited functional independence. Use of outcome tool(s) and clinical judgement create a POC that gives a: Clear prediction of patient's progress: LOW COMPLEXITY            TREATMENT:   (In addition to Assessment/Re-Assessment sessions the following treatments were rendered)     Pre-treatment Symptoms/Complaints:  burning knee  Pain: Initial: 6     Post Session:  4/10     Therapeutic Exercise: (45 group minutes):  Exercises per grid below to improve mobility and strength. Required minimal verbal cues to promote proper body alignment. Gait Training (15 Minutes):  Gait training to improve and/or restore physical functioning as related to mobility and strength.   Ambulated 70 Feet (ft) with Stand-by assistance using a Walker, rolling and minimal Safety awareness training;Verbal cues related to their stance phase to promote proper body alignment. Date:  4/12 Date:  4/13/18 Date:     ACTIVITY/EXERCISE AM PM AM PM AM PM   GROUP THERAPY  []  [x]  [x]  []  []  []   Ankle Pumps 10 15 20      Quad Sets 10 15 20      Gluteal Sets 10 15 20      Hip ABd/ADduction 10 15 20      Straight Leg Raises 10 15 20      Knee Slides 10 15 20      Short Arc Quads  15 20      Long Arc Quads         Chair Slides  15 20               B = bilateral; AA = active assistive; A = active; P = passive      Treatment/Session Assessment:     Response to Treatment:  Pt requires increased encouragement to participate in PT and push herself. Education:  [x] Home Exercises  [x] Fall Precautions  [] Hip Precautions [x] D/C Instruction Review  [x] Knee/Hip Prosthesis Review  [x] Walker Management/Safety [] Adaptive Equipment as Needed       Interdisciplinary Collaboration:   o Registered Nurse  o Rehabilitation Attendant    After treatment position/precautions:   o Up in chair  o Bed/Chair-wheels locked  o Call light within reach  o RN notified    Compliance with Program/Exercises: compliant most of the time. Recommendations/Intent for next treatment session:  Pt to be discharged home today with HHPT.       Total Treatment Duration:  PT Patient Time In/Time Out  Time In: 1030  Time Out: [address], PTA

## 2018-04-13 NOTE — PROGRESS NOTES
Patient achieved 1500 Ml/sec on IS. Patient encouraged to do 10 breaths every hour while awake-patient agreed and demonstrated. No shortness of breath or distress noted.

## 2018-04-13 NOTE — PROGRESS NOTES
Dilaudid 2 mg po for c/o pain and Zofran 4mg given slow IVP for c/o nausea after being up to bathroom. Gait steady using walker. Voids QS. Dsng dry and intact. Hemovac patent and charged with serosanguineous drainage noted. Neurovascular status remains WDL. Call light within reach.

## 2018-04-13 NOTE — PROGRESS NOTES
[address]  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 1. I certify that the patient needs intermittent care as follows: physical therapy: gait/stair training    2. I certify that this patient is homebound, that is: 1) patient requires the use of a walker device, special transportation, or assistance of another to leave the home; or 2) patient's condition makes leaving the home medically contraindicated; and 3) patient has a normal inability to leave the home and leaving the home requires considerable and taxing effort. Patient may leave the home for infrequent and short duration for medical reasons, and occasional absences for non-medical reasons. Homebound status is due to the following functional limitations: Patient's ambulation limited secondary to severe pain and requires the use of an assistive device and the assistance of a caregiver for safe completion. Patient with strength and ROM deficits limiting ambulation endurance requiring the use of an assistive device and the assistance of a caregiver. Patient deemed temporarily homebound secondary to increased risk for infection when leaving home and going out into the community. 3. I certify that this patient is under my care and that I, or a nurse practitioner or [facility], or clinical nurse specialist, or certified nurse midwife, working with me, had a Face-to-Face Encounter that meets the physician Face-to-Face Encounter requirements.   The following are the clinical findings from the [address] [place] [address] encounter that support the need for skilled services and is a summary of the encounter:  See hospital chart        Arjann May, BSW  4/13/2018      THE FOLLOWING TO BE COMPLETED BY THE COMMUNITY PHYSICIAN:    I concur with the findings described above from the F2F encounter that this patient is homebound and in need of a skilled service.     Certifying Physician: _____________________________________      Printed Certifying Physician Name: _____________________________________    Date: _________________

## 2018-04-13 NOTE — PROGRESS NOTES
HW DCd with catheter intact. Waiting for equipment to be delivered. Iceman to knee. Medicated for pain with tylenol. Had therapy, no drainage to gauze and tegaderm over site where drain was removed. Aquacel remains intact with scant drainage. Giorgio in room. Has been given prescriptions for dilaudid and aspirin and instructed how to take. Pt says she already has zofran at home. Has follow up appt already scheduled with Dr Heloise Hamman. Home therapy to see pt. Instructed to call doctor if having fever, excessive drainage, numbness or other problems. I have reviewed discharge instructions with the patient and grandson. The patient and grandson verbalized understanding.

## 2018-04-13 NOTE — PROGRESS NOTES
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 ([address] [T84.013A]  Status post left knee replacement [Z96.652]    LAB:    Recent Results (from the past 24 hour(s))   PLEASE READ & DOCUMENT PPD TEST IN 24 HRS    Collection Time: 18 10:15 AM   Result Value Ref Range    PPD  Negative    mm Induration 0 mm   HEMOGLOBIN    Collection Time: 18  4:31 AM   Result Value Ref Range    HGB 11.0 (L) 11.7 - 15.4 g/dL     Vital Signs:    Patient Vitals for the past 8 hrs:   BP Temp Pulse Resp SpO2   18 0428 124/76 98 °F (36.7 °C) 81 16 96 %   18 2346 131/78 98.2 °F (36.8 °C) 71 18 100 %     Temp (24hrs), Av.1 °F (36.7 °C), Min:97.4 °F (36.3 °C), Max:98.6 °F (37 °C)    Body mass index is 29.19 kg/(m^2).   Pain Control:   Pain Assessment  Pain Scale 1: Numeric (0 - 10)  Pain Intensity 1: 6  Pain Onset 1: years   Pain Location 1: Knee  Pain Orientation 1: Left  Pain Description 1: Aching  Pain Intervention(s) 1: Medication (see MAR)    Subjective: Doing well, No complaints, No SOB, No Chest Pain, No Nausea or Vomitting     Objective: Vital Signs are Stable, No Acute Distress, Alert and Oriented, Dressing is Dry,  Neurovascular exam is normal.       PT/OT:            Assistive Device: Walker (comment)        LLE AROM  L Knee Flexion: 52  L Knee Extension: 4       Weight Bearing Status: WBAT    Meds:  [unfilled]  [unfilled]  [unfilled]    Assessment:   Patient Active Problem List   Diagnosis Code    Uncontrolled hypertension I10    Anxiety F41.9    Depression F32.9    Hypothyroidism E03.9    DJD (degenerative joint disease) of knee M17.10    Elevated blood pressure reading R03.0    Status post revision of total knee replacement, left I93.757             Plan: Continue Physical Therapy, Monitor  Hbg, home today or tomorrow.         Signed By: Alma Arnold MD

## 2018-04-13 NOTE — PROGRESS NOTES
Ace bandage removed. HVAC DCd, site covered with gauze and tegaderm. Aquacel to L knee incision intact with scant drainage. Iceman back on. NV checks WDL.

## 2018-04-13 NOTE — DISCHARGE INSTRUCTIONS
[facility]   Patient Discharge Instructions    Elvi Sidhu / [id number] : 1940    Admitted 2018 Discharged: 2018     IF YOU HAVE ANY PROBLEMS ONCE YOU ARE AT HOME CALL THE FOLLOWING NUMBERS:   Main office number: [phone]      Medications    · The medications you are to continue on are listed on the medication reconciliation sheet. · Narcotic pain medications as well as supplemental iron can cause constipation. If this occurs try stopping the narcotic pain medication and/or the iron. · It is important that you take the medication exactly as they are prescribed. · Medications which increase your risk of blood clots are listed to stop for 5 weeks after surgery as well as medications or supplements which increase your risk of bleeding complications. · Keep your medication in the bottles provided by the pharmacist and keep a list of the medication names, dosages, and times to be taken in your wallet. · Do not take other medications without consulting your doctor. Important Information    Do NOT smoke as this will greatly increase your risk of infection! Resume your prehospital diet. If you have excessive nausea or vomitting call your doctor's office     Leg swelling and warmth is normal for 6 months after surgery. If you experience swelling in your leg elevate you leg while laying down with your toes above your heart. If you have sudden onset severe swelling with leg pain call our office. Use Abdi Hose stockings until we see you in the office for your follow up appointment. The stitches deep inside take approximately 6 months to dissolve. There will be sharp shooting, stinging and burning pain. This is normal and will resolve between 3-6 months after surgery. Difficulty sleeping is normal following total Knee and Hip replacement. You may try melatonin, an over-the-counter sleep aid or benadryl to help with sleep.  Most patients will resume sleeping through the night 8 weeks after surgery. Home Physical Therapy is arranged. Home Health will contact you within 48 hrs of discharge that you have chosen. If you have not received a call within this time frame please contact that provider you chose. You should be given this information before you leave the hospital.     You are at a risk for falls. Use the rolling walker when walking. Patients who have had a joint replacement should not drive if they are still taking narcotic pain mediation during the daytime hours. Most patients wean themselves off of pain medication within 2-5 weeks after surgery. When to Call the office    - If you have a temperature greater then 101  - Uncontrolled vomiting   - Loose control of your bladder or bowel function  - Are unable to bear any weight   - Need a pain medication refill       DISCHARGE SUMMARY from Nurse    The following personal items collected during your admission are returned to you:   Dental Appliance: Dental Appliances: Partials, Uppers, With patient  Vision: Visual Aid: Glasses  Hearing Aid:   na  Jewelry: Jewelry: Ring, Earrings  Clothing:   self  Other Valuables: Other Valuables: Cell Phone (all valuables with Patsy Peace)  Valuables sent to safe:   na    PATIENT INSTRUCTIONS:    After general anesthesia or intravenous sedation, for 24 hours or while taking prescription Narcotics:  · Limit your activities  · Do not drive and operate hazardous machinery  · Do not make important personal or business decisions  · Do  not drink alcoholic beverages  · If you have not urinated within 8 hours after discharge, please contact your surgeon on call.     Report the following to your surgeon:  · Excessive pain, swelling, redness or odor of or around the surgical area  · Temperature over 101  · Nausea and vomiting lasting longer than 4 hours or if unable to take medications  · Any signs of decreased circulation or nerve impairment to extremity: change in color, persistent  numbness, tingling, coldness or increase pain  · Any questions, call office @ [phone]      Keep scheduled follow up appointment. If need to change, call office @ [phone]. *  Please give a list of your current medications to your Primary Care Provider. *  Please update this list whenever your medications are discontinued, doses are      changed, or new medications (including over-the-counter products) are added. *  Please carry medication information at all times in case of emergency situations. Total Knee Replacement: What to Expect at [address] [place] [address]    When you leave the hospital, you should be able to move around with a walker or crutches. But you will need someone to help you at home for the next few weeks or until you have more energy and can move around better. If there is no one to help you at home, you may go to a rehabilitation center. You will go home with a bandage and stitches or staples. Change the bandage as your doctor tells you to. Your doctor will remove your stitches or staples 10 to 21 days after your surgery. You may still have some mild pain, and the area may be swollen for 3 to 6 months after surgery. Your knee will continue to improve for 6 to 12 months. You will probably use a walker for 1 to 3 weeks and then use crutches. When you are ready, you can use a cane. You will probably be able to walk on your own in 4 to 8 weeks. You will need to do months of physical rehabilitation (rehab) after a knee replacement. Rehab will help you strengthen the muscles of the knee and help you regain movement. After you recover, your artificial knee will allow you to do normal daily activities with less pain or no pain at all. You may be able to hike, dance, ride a bike, and play golf. Talk to your doctor about whether you can do more strenuous activities. Always tell your caregivers that you have an artificial knee.   How long it will take to walk on your own, return to normal activities, and go back to work depends on your health and how well your rehabilitation (rehab) program goes. The better you do with your rehab exercises, the quicker you will get your strength and movement back. This care sheet gives you a general idea about how long it will take for you to recover. But each person recovers at a different pace. Follow the steps below to get better as quickly as possible. How can you care for yourself at home? Activity  ? · Rest when you feel tired. You may take a nap, but do not stay in bed all day. When you sit, use a chair with arms. You can use the arms to help you stand up. ? · Work with your physical therapist to find the best way to exercise. You may be able to take frequent, short walks using crutches or a walker. What you can do as your knee heals will depend on whether your new knee is cemented or uncemented. You may not be able to do certain things for a while if your new knee is uncemented. ? · After your knee has healed enough, you can do more strenuous activities with caution. ¨ You can golf, but use a golf cart, and do not wear shoes with spikes. ¨ You can bike on a flat road or on a stationary bike. Avoid biking up hills. ¨ Your doctor may suggest that you stay away from activities that put stress on your knee. These include tennis or badminton, squash or racquetball, contact sports like football, jumping (such as in basketball), jogging, or running. ¨ Avoid activities where you might fall. These include horseback riding, skiing, and mountain biking. ? · Do not sit for more than 1 hour at a time. Get up and walk around for a while before you sit again. If you must sit for a long time, prop up your leg with a chair or footstool. This will help you avoid swelling. ? · Ask your doctor when you can shower. You may need to take sponge baths until your stitches or staples have been removed. ? · Ask your doctor when you can drive again.  It may take up to 8 weeks after knee replacement surgery before it is safe for you to drive. ? · When you get into a car, sit on the edge of the seat. Then pull in your legs, and turn to face the front. ? · You should be able to do many everyday activities 3 to 6 weeks after your surgery. You will probably need to take 4 to 16 weeks off from work. When you can go back to work depends on the type of work you do and how you feel. ? · Ask your doctor when it is okay for you to have sex. ? · Do not lift anything heavier than 10 pounds and do not lift weights for 12 weeks. Diet  ? · By the time you leave the hospital, you should be eating your normal diet. If your stomach is upset, try bland, low-fat foods like plain rice, broiled chicken, toast, and yogurt. Your doctor may suggest that you take iron and vitamin supplements. ? · Drink plenty of fluids (unless your doctor tells you not to). ? · Eat healthy foods, and watch your portion sizes. Try to stay at your ideal weight. Too much weight puts more stress on your new knee. ? · You may notice that your bowel movements are not regular right after your surgery. This is common. Try to avoid constipation and straining with bowel movements. You may want to take a fiber supplement every day. If you have not had a bowel movement after a couple of days, ask your doctor about taking a mild laxative. Medicines  ? · Your doctor will tell you if and when you can restart your medicines. He or she will also give you instructions about taking any new medicines. ? · If you take blood thinners, such as warfarin (Coumadin), clopidogrel (Plavix), or aspirin, be sure to talk to your doctor. He or she will tell you if and when to start taking those medicines again. Make sure that you understand exactly what your doctor wants you to do.   ? · Your doctor may give you a blood-thinning medicine to prevent blood clots.  If you take a blood thinner, be sure you get instructions about how to take your medicine safely. Blood thinners can cause serious bleeding problems. This medicine could be in pill form or as a shot (injection). If a shot is necessary, your doctor will tell you how to do this. ? · Be safe with medicines. Take pain medicines exactly as directed. ¨ If the doctor gave you a prescription medicine for pain, take it as prescribed. ¨ If you are not taking a prescription pain medicine, ask your doctor if you can take an over-the-counter medicine. ¨ Plan to take your pain medicine 30 minutes before exercises. It is easier to prevent pain before it starts than to stop it once it has started. ? · If you think your pain medicine is making you sick to your stomach:  ¨ Take your medicine after meals (unless your doctor has told you not to). ¨ Ask your doctor for a different pain medicine. ? · If your doctor prescribed antibiotics, take them as directed. Do not stop taking them just because you feel better. You need to take the full course of antibiotics. Incision care  ? · You will have a bandage over the cut (incision) and staples or stitches. Take the bandage off when your doctor says it is okay. ? · Your doctor will remove the staples or stitches 10 days to 3 weeks after the surgery and replace them with strips of tape. Leave the tape on for a week or until it falls off. Exercise  ? · Your rehab program will give you a number of exercises to do to help you get back your knee's range of motion and strength. Always do them as your therapist tells you. Ice and elevation  ? · For pain and swelling, put ice or a cold pack on the area for 10 to 20 minutes at a time. Put a thin cloth between the ice and your skin. Other instructions  ? · Continue to wear your support stockings as your doctor says. These help to prevent blood clots. The length of time that you will have to wear them depends on your activity level and the amount of swelling. ? · You have metal pieces in your knee. These may set off some airport metal detectors. Carry a medical alert card that says you have an artificial joint, just in case. Follow-up care is a key part of your treatment and safety. Be sure to make and go to all appointments, and call your doctor if you are having problems. It's also a good idea to know your test results and keep a list of the medicines you take. When should you call for help? Call 911 anytime you think you may need emergency care. For example, call if:  ? · You passed out (lost consciousness). ? · You have severe trouble breathing. ? · You have sudden chest pain and shortness of breath, or you cough up blood. ?Call your doctor now or seek immediate medical care if:  ? · You have signs of infection, such as:  ¨ Increased pain, swelling, warmth, or redness. ¨ Red streaks leading from the incision. ¨ Pus draining from the incision. ¨ A fever. ? · You have signs of a blood clot, such as:  ¨ Pain in your calf, back of the knee, thigh, or groin. ¨ Redness and swelling in your leg or groin. ? · Your incision comes open and begins to bleed, or the bleeding increases. ? · You have pain that does not get better after you take pain medicine. ? Watch closely for changes in your health, and be sure to contact your doctor if:  ? · You do not have a bowel movement after taking a laxative. Where can you learn more? Go to http://li-francisco.info/. Enter U865 in the search box to learn more about \"Total Knee Replacement: What to Expect at Home. \"  Current as of: March 21, 2017  Content Version: 11.4  © 5088-1597 [employer]. Care instructions adapted under license by [employer] (which disclaims liability or warranty for this information).  If you have questions about a medical condition or this instruction, always ask your healthcare professional. [address]  any warranty or liability for your use of this information. These are general instructions for a healthy lifestyle:    No smoking/ No tobacco products/ Avoid exposure to second hand smoke    Surgeon General's Warning:  Quitting smoking now greatly reduces serious risk to your health. Obesity, smoking, and sedentary lifestyle greatly increases your risk for illness    A healthy diet, regular physical exercise & weight monitoring are important for maintaining a healthy lifestyle    You may be retaining fluid if you have a history of heart failure or if you experience any of the following symptoms:  Weight gain of 3 pounds or more overnight or 5 pounds in a week, increased swelling in our hands or feet or shortness of breath while lying flat in bed. Please call your doctor as soon as you notice any of these symptoms; do not wait until your next office visit. Recognize signs and symptoms of STROKE:    F-face looks uneven    A-arms unable to move or move even    S-speech slurred or non-existent    T-time-call 911 as soon as signs and symptoms begin-DO NOT go       Back to bed or wait to see if you get better-TIME IS BRAIN. The discharge information has been reviewed with the patient. The patient verbalized understanding. Information obtained by :  I understand that if any problems occur once I am at home I am to contact my physician. I understand and acknowledge receipt of the instructions indicated above.                                                                                                                                            Physician's or R.N.'s Signature                                                                  Date/Time                                                                                                                                              Patient or Representative Signature                                                          Date/Time

## 2018-04-13 NOTE — PROGRESS NOTES
Care Management Interventions  Mode of Transport at Discharge: Self  Transition of Care Consult (CM Consult): [address]: Yes  Discharge Durable Medical Equipment: Yes  Physical Therapy Consult: Yes  Occupational Therapy Consult: Yes  Confirm Follow Up Transport: Family  Plan discussed with Pt/Family/Caregiver: Yes  Freedom of Choice Offered: Yes  Discharge Location  Discharge Placement: Home with home health    Patient is a 66y.o. year old female admitted for Left TKA . Patient lives with Her spouse and plans to return home on discharge. Order received to arrange home health. Patient without preference towards agency. Referral sent [facility]. Patient requesting we arrange a walker and bedside commode. Referral sent to [facility] who will deliver to the hospital room prior to discharge. Will follow until discharge.   Linn Rodrigez

## 2018-04-14 ENCOUNTER — HOME CARE VISIT (OUTPATIENT)
Dept: SCHEDULING | Facility: HOME HEALTH | Age: 78
End: 2018-04-14
Payer: MEDICARE

## 2018-04-14 LAB
BACTERIA SPEC CULT: NORMAL
GRAM STN SPEC: NORMAL
SERVICE CMNT-IMP: NORMAL

## 2018-04-14 PROCEDURE — 3331090001 HH PPS REVENUE CREDIT

## 2018-04-14 PROCEDURE — G0151 HHCP-SERV OF PT,EA 15 MIN: HCPCS

## 2018-04-14 PROCEDURE — 400013 HH SOC

## 2018-04-14 PROCEDURE — 3331090002 HH PPS REVENUE DEBIT

## 2018-04-15 VITALS
DIASTOLIC BLOOD PRESSURE: 74 MMHG | HEART RATE: 78 BPM | SYSTOLIC BLOOD PRESSURE: 146 MMHG | RESPIRATION RATE: 18 BRPM | TEMPERATURE: 97.3 F | OXYGEN SATURATION: 96 %

## 2018-04-15 PROCEDURE — 3331090001 HH PPS REVENUE CREDIT

## 2018-04-15 PROCEDURE — 3331090002 HH PPS REVENUE DEBIT

## 2018-04-16 ENCOUNTER — HOME CARE VISIT (OUTPATIENT)
Dept: SCHEDULING | Facility: HOME HEALTH | Age: 78
End: 2018-04-16
Payer: MEDICARE

## 2018-04-16 PROCEDURE — G0151 HHCP-SERV OF PT,EA 15 MIN: HCPCS

## 2018-04-16 PROCEDURE — 3331090001 HH PPS REVENUE CREDIT

## 2018-04-16 PROCEDURE — 3331090002 HH PPS REVENUE DEBIT

## 2018-04-17 VITALS — SYSTOLIC BLOOD PRESSURE: 122 MMHG | HEART RATE: 78 BPM | TEMPERATURE: 97.3 F | DIASTOLIC BLOOD PRESSURE: 74 MMHG

## 2018-04-17 PROCEDURE — 3331090002 HH PPS REVENUE DEBIT

## 2018-04-17 PROCEDURE — 3331090001 HH PPS REVENUE CREDIT

## 2018-04-18 ENCOUNTER — HOME CARE VISIT (OUTPATIENT)
Dept: SCHEDULING | Facility: HOME HEALTH | Age: 78
End: 2018-04-18
Payer: MEDICARE

## 2018-04-18 LAB
BACTERIA SPEC CULT: NORMAL
SERVICE CMNT-IMP: NORMAL

## 2018-04-18 PROCEDURE — G0157 HHC PT ASSISTANT EA 15: HCPCS

## 2018-04-18 PROCEDURE — 3331090001 HH PPS REVENUE CREDIT

## 2018-04-18 PROCEDURE — 3331090002 HH PPS REVENUE DEBIT

## 2018-04-19 VITALS
RESPIRATION RATE: 18 BRPM | TEMPERATURE: 97.8 F | HEART RATE: 94 BPM | SYSTOLIC BLOOD PRESSURE: 108 MMHG | DIASTOLIC BLOOD PRESSURE: 68 MMHG

## 2018-04-19 PROCEDURE — 3331090002 HH PPS REVENUE DEBIT

## 2018-04-19 PROCEDURE — 3331090001 HH PPS REVENUE CREDIT

## 2018-04-20 ENCOUNTER — HOME CARE VISIT (OUTPATIENT)
Dept: SCHEDULING | Facility: HOME HEALTH | Age: 78
End: 2018-04-20
Payer: MEDICARE

## 2018-04-20 PROCEDURE — 3331090001 HH PPS REVENUE CREDIT

## 2018-04-20 PROCEDURE — G0157 HHC PT ASSISTANT EA 15: HCPCS

## 2018-04-20 PROCEDURE — 3331090002 HH PPS REVENUE DEBIT

## 2018-04-21 PROCEDURE — 3331090001 HH PPS REVENUE CREDIT

## 2018-04-21 PROCEDURE — 3331090002 HH PPS REVENUE DEBIT

## 2018-04-22 ENCOUNTER — HOME CARE VISIT (OUTPATIENT)
Dept: SCHEDULING | Facility: HOME HEALTH | Age: 78
End: 2018-04-22
Payer: MEDICARE

## 2018-04-22 PROCEDURE — G0151 HHCP-SERV OF PT,EA 15 MIN: HCPCS

## 2018-04-22 PROCEDURE — 3331090001 HH PPS REVENUE CREDIT

## 2018-04-22 PROCEDURE — 3331090002 HH PPS REVENUE DEBIT

## 2018-04-23 VITALS — SYSTOLIC BLOOD PRESSURE: 118 MMHG | HEART RATE: 69 BPM | DIASTOLIC BLOOD PRESSURE: 70 MMHG

## 2018-04-23 VITALS
RESPIRATION RATE: 18 BRPM | SYSTOLIC BLOOD PRESSURE: 132 MMHG | DIASTOLIC BLOOD PRESSURE: 82 MMHG | TEMPERATURE: 97.8 F | HEART RATE: 84 BPM

## 2018-04-23 PROCEDURE — 3331090002 HH PPS REVENUE DEBIT

## 2018-04-23 PROCEDURE — 3331090001 HH PPS REVENUE CREDIT

## 2018-04-24 PROCEDURE — 3331090002 HH PPS REVENUE DEBIT

## 2018-04-24 PROCEDURE — 3331090001 HH PPS REVENUE CREDIT

## 2018-04-25 ENCOUNTER — HOME CARE VISIT (OUTPATIENT)
Dept: SCHEDULING | Facility: HOME HEALTH | Age: 78
End: 2018-04-25
Payer: MEDICARE

## 2018-04-25 PROCEDURE — 3331090001 HH PPS REVENUE CREDIT

## 2018-04-25 PROCEDURE — 3331090002 HH PPS REVENUE DEBIT

## 2018-04-25 PROCEDURE — G0157 HHC PT ASSISTANT EA 15: HCPCS

## 2018-04-26 VITALS
DIASTOLIC BLOOD PRESSURE: 80 MMHG | HEART RATE: 78 BPM | SYSTOLIC BLOOD PRESSURE: 110 MMHG | TEMPERATURE: 98.1 F | RESPIRATION RATE: 18 BRPM

## 2018-04-26 PROCEDURE — 3331090001 HH PPS REVENUE CREDIT

## 2018-04-26 PROCEDURE — 3331090002 HH PPS REVENUE DEBIT

## 2018-04-27 ENCOUNTER — HOME CARE VISIT (OUTPATIENT)
Dept: HOME HEALTH SERVICES | Facility: HOME HEALTH | Age: 78
End: 2018-04-27
Payer: MEDICARE

## 2018-04-27 ENCOUNTER — HOME CARE VISIT (OUTPATIENT)
Dept: SCHEDULING | Facility: HOME HEALTH | Age: 78
End: 2018-04-27
Payer: MEDICARE

## 2018-04-27 VITALS
RESPIRATION RATE: 16 BRPM | TEMPERATURE: 98.2 F | HEART RATE: 88 BPM | SYSTOLIC BLOOD PRESSURE: 124 MMHG | DIASTOLIC BLOOD PRESSURE: 82 MMHG

## 2018-04-27 PROCEDURE — 3331090002 HH PPS REVENUE DEBIT

## 2018-04-27 PROCEDURE — 3331090001 HH PPS REVENUE CREDIT

## 2018-04-27 PROCEDURE — G0157 HHC PT ASSISTANT EA 15: HCPCS

## 2018-04-28 PROCEDURE — 3331090002 HH PPS REVENUE DEBIT

## 2018-04-28 PROCEDURE — 3331090001 HH PPS REVENUE CREDIT

## 2018-04-29 PROCEDURE — 3331090001 HH PPS REVENUE CREDIT

## 2018-04-29 PROCEDURE — 3331090002 HH PPS REVENUE DEBIT

## 2018-04-30 ENCOUNTER — HOME CARE VISIT (OUTPATIENT)
Dept: SCHEDULING | Facility: HOME HEALTH | Age: 78
End: 2018-04-30
Payer: MEDICARE

## 2018-04-30 PROCEDURE — 3331090002 HH PPS REVENUE DEBIT

## 2018-04-30 PROCEDURE — G0157 HHC PT ASSISTANT EA 15: HCPCS

## 2018-04-30 PROCEDURE — 3331090001 HH PPS REVENUE CREDIT

## 2018-05-01 ENCOUNTER — HOME CARE VISIT (OUTPATIENT)
Dept: HOME HEALTH SERVICES | Facility: HOME HEALTH | Age: 78
End: 2018-05-01
Payer: MEDICARE

## 2018-05-01 VITALS
TEMPERATURE: 98.2 F | RESPIRATION RATE: 18 BRPM | SYSTOLIC BLOOD PRESSURE: 120 MMHG | HEART RATE: 92 BPM | DIASTOLIC BLOOD PRESSURE: 78 MMHG

## 2018-05-01 PROCEDURE — 3331090002 HH PPS REVENUE DEBIT

## 2018-05-01 PROCEDURE — 3331090001 HH PPS REVENUE CREDIT

## 2018-05-02 ENCOUNTER — HOME CARE VISIT (OUTPATIENT)
Dept: SCHEDULING | Facility: HOME HEALTH | Age: 78
End: 2018-05-02
Payer: MEDICARE

## 2018-05-02 VITALS
HEART RATE: 81 BPM | RESPIRATION RATE: 21 BRPM | SYSTOLIC BLOOD PRESSURE: 128 MMHG | TEMPERATURE: 97.7 F | DIASTOLIC BLOOD PRESSURE: 76 MMHG

## 2018-05-02 PROCEDURE — G0151 HHCP-SERV OF PT,EA 15 MIN: HCPCS

## 2018-05-02 PROCEDURE — 3331090001 HH PPS REVENUE CREDIT

## 2018-05-02 PROCEDURE — 3331090002 HH PPS REVENUE DEBIT

## 2019-05-30 PROBLEM — R94.31 ABNORMAL EKG: Status: ACTIVE | Noted: 2019-05-30

## 2019-07-12 PROBLEM — R06.09 DOE (DYSPNEA ON EXERTION): Status: ACTIVE | Noted: 2019-07-12

## 2019-07-31 ENCOUNTER — HOSPITAL ENCOUNTER (OUTPATIENT)
Dept: CT IMAGING | Age: 79
Discharge: HOME | End: 2019-07-31
Attending: INTERNAL MEDICINE
Payer: MEDICARE

## 2019-07-31 VITALS — WEIGHT: 198 LBS | BODY MASS INDEX: 30.01 KG/M2 | HEIGHT: 68 IN

## 2019-07-31 DIAGNOSIS — R06.09 DOE (DYSPNEA ON EXERTION): ICD-10-CM

## 2019-07-31 LAB — CREAT BLD-MCNC: 1 MG/DL (ref 0.8–1.5)

## 2019-07-31 PROCEDURE — 74011000258 HC RX REV CODE- 258: Performed by: INTERNAL MEDICINE

## 2019-07-31 PROCEDURE — 74011636320 HC RX REV CODE- 636/320: Performed by: INTERNAL MEDICINE

## 2019-07-31 PROCEDURE — 71260 CT THORAX DX C+: CPT

## 2019-07-31 PROCEDURE — 82565 ASSAY OF CREATININE: CPT

## 2019-07-31 RX ORDER — SODIUM CHLORIDE 0.9 % (FLUSH) 0.9 %
10 SYRINGE (ML) INJECTION
Status: COMPLETED | OUTPATIENT
Start: 2019-07-31 | End: 2019-07-31

## 2019-07-31 RX ADMIN — IOPAMIDOL 100 ML: 755 INJECTION, SOLUTION INTRAVENOUS at 10:53

## 2019-07-31 RX ADMIN — SODIUM CHLORIDE 100 ML: 900 INJECTION, SOLUTION INTRAVENOUS at 10:53

## 2019-07-31 RX ADMIN — Medication 10 ML: at 10:53

## 2019-08-14 PROBLEM — I10 ESSENTIAL HYPERTENSION: Status: ACTIVE | Noted: 2019-08-14

## 2019-09-30 PROBLEM — R93.1 ABNORMAL NUCLEAR CARDIAC IMAGING TEST: Status: ACTIVE | Noted: 2019-09-30

## 2019-09-30 PROBLEM — I25.84 CORONARY ARTERY CALCIFICATION: Status: ACTIVE | Noted: 2019-09-30

## 2019-09-30 PROBLEM — I25.10 CORONARY ARTERY CALCIFICATION: Status: ACTIVE | Noted: 2019-09-30

## 2019-10-03 ENCOUNTER — HOSPITAL ENCOUNTER (OUTPATIENT)
Dept: LAB | Age: 79
Discharge: HOME | End: 2019-10-03
Payer: MEDICARE

## 2019-10-03 DIAGNOSIS — R06.09 DOE (DYSPNEA ON EXERTION): ICD-10-CM

## 2019-10-03 DIAGNOSIS — I25.84 CORONARY ARTERY CALCIFICATION: ICD-10-CM

## 2019-10-03 DIAGNOSIS — R93.1 ABNORMAL NUCLEAR CARDIAC IMAGING TEST: ICD-10-CM

## 2019-10-03 DIAGNOSIS — I25.10 CORONARY ARTERY CALCIFICATION: ICD-10-CM

## 2019-10-03 LAB
ANION GAP SERPL CALC-SCNC: 4 MMOL/L (ref 7–16)
BASOPHILS # BLD: 0.1 K/UL (ref 0–0.2)
BASOPHILS NFR BLD: 1 % (ref 0–2)
BUN SERPL-MCNC: 20 MG/DL (ref 8–23)
CALCIUM SERPL-MCNC: 8.7 MG/DL (ref 8.3–10.4)
CHLORIDE SERPL-SCNC: 108 MMOL/L (ref 98–107)
CO2 SERPL-SCNC: 31 MMOL/L (ref 21–32)
CREAT SERPL-MCNC: 1.14 MG/DL (ref 0.6–1)
DIFFERENTIAL METHOD BLD: ABNORMAL
EOSINOPHIL # BLD: 0.2 K/UL (ref 0–0.8)
EOSINOPHIL NFR BLD: 2 % (ref 0.5–7.8)
ERYTHROCYTE [DISTWIDTH] IN BLOOD BY AUTOMATED COUNT: 12.6 % (ref 11.9–14.6)
GLUCOSE SERPL-MCNC: 83 MG/DL (ref 65–100)
HCT VFR BLD AUTO: 42.9 % (ref 35.8–46.3)
HGB BLD-MCNC: 13.7 G/DL (ref 11.7–15.4)
IMM GRANULOCYTES # BLD AUTO: 0 K/UL (ref 0–0.5)
IMM GRANULOCYTES NFR BLD AUTO: 0 % (ref 0–5)
INR PPP: 1
LYMPHOCYTES # BLD: 1.7 K/UL (ref 0.5–4.6)
LYMPHOCYTES NFR BLD: 21 % (ref 13–44)
MCH RBC QN AUTO: 32.2 PG (ref 26.1–32.9)
MCHC RBC AUTO-ENTMCNC: 31.9 G/DL (ref 31.4–35)
MCV RBC AUTO: 100.9 FL (ref 79.6–97.8)
MONOCYTES # BLD: 0.7 K/UL (ref 0.1–1.3)
MONOCYTES NFR BLD: 9 % (ref 4–12)
NEUTS SEG # BLD: 5.3 K/UL (ref 1.7–8.2)
NEUTS SEG NFR BLD: 66 % (ref 43–78)
NRBC # BLD: 0 K/UL (ref 0–0.2)
PLATELET # BLD AUTO: 242 K/UL (ref 150–450)
PMV BLD AUTO: 9.7 FL (ref 9.4–12.3)
POTASSIUM SERPL-SCNC: 4 MMOL/L (ref 3.5–5.1)
PROTHROMBIN TIME: 13.1 SEC (ref 11.7–14.5)
RBC # BLD AUTO: 4.25 M/UL (ref 4.05–5.2)
SODIUM SERPL-SCNC: 143 MMOL/L (ref 136–145)
WBC # BLD AUTO: 8 K/UL (ref 4.3–11.1)

## 2019-10-03 PROCEDURE — 80048 BASIC METABOLIC PNL TOTAL CA: CPT

## 2019-10-03 PROCEDURE — 85025 COMPLETE CBC W/AUTO DIFF WBC: CPT

## 2019-10-03 PROCEDURE — 36415 COLL VENOUS BLD VENIPUNCTURE: CPT

## 2019-10-03 PROCEDURE — 85610 PROTHROMBIN TIME: CPT

## 2019-10-04 RX ORDER — ASPIRIN 81 MG/1
81 TABLET ORAL DAILY
COMMUNITY
End: 2019-10-30

## 2019-10-04 NOTE — PROGRESS NOTES
Patient pre-assessment complete for [facility] poss with Dr Antonio Sawyer  scheduled for 10/7/19 at 11am, arrival time 9am. Patient verified using . Patient instructed to bring all home medications in labeled bottles on the day of procedure. NPO status reinforced. Patient informed to take a full dose aspirin 325mg  or 81 mg x 4 on the day of procedure. Instructed they can take all other medications excluding vitamins & supplements. Patient verbalizes understanding of all instructions & denies any questions at this time.

## 2019-10-07 ENCOUNTER — HOSPITAL ENCOUNTER (OUTPATIENT)
Dept: CARDIAC CATH/INVASIVE PROCEDURES | Age: 79
Discharge: HOME | End: 2019-10-07
Attending: INTERNAL MEDICINE | Admitting: INTERNAL MEDICINE
Payer: MEDICARE

## 2019-10-07 VITALS
HEIGHT: 68 IN | OXYGEN SATURATION: 98 % | HEART RATE: 55 BPM | DIASTOLIC BLOOD PRESSURE: 79 MMHG | SYSTOLIC BLOOD PRESSURE: 135 MMHG | WEIGHT: 194 LBS | BODY MASS INDEX: 29.4 KG/M2 | RESPIRATION RATE: 14 BRPM

## 2019-10-07 LAB
ANION GAP SERPL CALC-SCNC: 4 MMOL/L (ref 7–16)
ATRIAL RATE: 50 BPM
BUN SERPL-MCNC: 20 MG/DL (ref 8–23)
CALCIUM SERPL-MCNC: 9.2 MG/DL (ref 8.3–10.4)
CALCULATED P AXIS, ECG09: 43 DEGREES
CALCULATED R AXIS, ECG10: -89 DEGREES
CALCULATED T AXIS, ECG11: 3 DEGREES
CHLORIDE SERPL-SCNC: 110 MMOL/L (ref 98–107)
CO2 SERPL-SCNC: 28 MMOL/L (ref 21–32)
CREAT SERPL-MCNC: 1.19 MG/DL (ref 0.6–1)
DIAGNOSIS, 93000: NORMAL
GLUCOSE SERPL-MCNC: 86 MG/DL (ref 65–100)
P-R INTERVAL, ECG05: 138 MS
POTASSIUM SERPL-SCNC: 4.6 MMOL/L (ref 3.5–5.1)
Q-T INTERVAL, ECG07: 444 MS
QRS DURATION, ECG06: 142 MS
QTC CALCULATION (BEZET), ECG08: 404 MS
SODIUM SERPL-SCNC: 142 MMOL/L (ref 136–145)
VENTRICULAR RATE, ECG03: 50 BPM

## 2019-10-07 PROCEDURE — C1769 GUIDE WIRE: HCPCS

## 2019-10-07 PROCEDURE — C1894 INTRO/SHEATH, NON-LASER: HCPCS

## 2019-10-07 PROCEDURE — 77030029997 HC DEV COM RDL R BND TELE -B

## 2019-10-07 PROCEDURE — 74011250636 HC RX REV CODE- 250/636: Performed by: INTERNAL MEDICINE

## 2019-10-07 PROCEDURE — 77030015766

## 2019-10-07 PROCEDURE — 80048 BASIC METABOLIC PNL TOTAL CA: CPT

## 2019-10-07 PROCEDURE — 93458 L HRT ARTERY/VENTRICLE ANGIO: CPT

## 2019-10-07 PROCEDURE — 77030004534 HC CATH ANGI DX INFN CARD -A

## 2019-10-07 PROCEDURE — 99152 MOD SED SAME PHYS/QHP 5/>YRS: CPT

## 2019-10-07 PROCEDURE — 74011636320 HC RX REV CODE- 636/320: Performed by: INTERNAL MEDICINE

## 2019-10-07 PROCEDURE — 93005 ELECTROCARDIOGRAM TRACING: CPT | Performed by: INTERNAL MEDICINE

## 2019-10-07 PROCEDURE — 74011000250 HC RX REV CODE- 250: Performed by: INTERNAL MEDICINE

## 2019-10-07 RX ORDER — ONDANSETRON 2 MG/ML
4 INJECTION INTRAMUSCULAR; INTRAVENOUS
Status: COMPLETED | OUTPATIENT
Start: 2019-10-07 | End: 2019-10-07

## 2019-10-07 RX ORDER — GUAIFENESIN 100 MG/5ML
324 LIQUID (ML) ORAL ONCE
Status: DISCONTINUED | OUTPATIENT
Start: 2019-10-07 | End: 2019-10-07 | Stop reason: HOSPADM

## 2019-10-07 RX ORDER — SODIUM CHLORIDE 9 MG/ML
250 INJECTION, SOLUTION INTRAVENOUS CONTINUOUS
Status: DISCONTINUED | OUTPATIENT
Start: 2019-10-07 | End: 2019-10-07 | Stop reason: HOSPADM

## 2019-10-07 RX ORDER — SODIUM CHLORIDE 0.9 % (FLUSH) 0.9 %
5-40 SYRINGE (ML) INJECTION AS NEEDED
Status: DISCONTINUED | OUTPATIENT
Start: 2019-10-07 | End: 2019-10-07 | Stop reason: HOSPADM

## 2019-10-07 RX ORDER — MIDAZOLAM HYDROCHLORIDE 1 MG/ML
.5-5 INJECTION, SOLUTION INTRAMUSCULAR; INTRAVENOUS
Status: DISCONTINUED | OUTPATIENT
Start: 2019-10-07 | End: 2019-10-07 | Stop reason: HOSPADM

## 2019-10-07 RX ORDER — SODIUM CHLORIDE 0.9 % (FLUSH) 0.9 %
5 SYRINGE (ML) INJECTION AS NEEDED
Status: DISCONTINUED | OUTPATIENT
Start: 2019-10-07 | End: 2019-10-07 | Stop reason: HOSPADM

## 2019-10-07 RX ORDER — HEPARIN SODIUM 200 [USP'U]/100ML
3 INJECTION, SOLUTION INTRAVENOUS CONTINUOUS
Status: DISCONTINUED | OUTPATIENT
Start: 2019-10-07 | End: 2019-10-07 | Stop reason: HOSPADM

## 2019-10-07 RX ORDER — LIDOCAINE HYDROCHLORIDE 10 MG/ML
5-40 INJECTION INFILTRATION; PERINEURAL
Status: DISCONTINUED | OUTPATIENT
Start: 2019-10-07 | End: 2019-10-07 | Stop reason: HOSPADM

## 2019-10-07 RX ORDER — SODIUM CHLORIDE 9 MG/ML
75 INJECTION, SOLUTION INTRAVENOUS CONTINUOUS
Status: DISCONTINUED | OUTPATIENT
Start: 2019-10-07 | End: 2019-10-07 | Stop reason: HOSPADM

## 2019-10-07 RX ORDER — SODIUM CHLORIDE 0.9 % (FLUSH) 0.9 %
5-40 SYRINGE (ML) INJECTION EVERY 8 HOURS
Status: DISCONTINUED | OUTPATIENT
Start: 2019-10-07 | End: 2019-10-07 | Stop reason: HOSPADM

## 2019-10-07 RX ORDER — FENTANYL CITRATE 50 UG/ML
25-100 INJECTION, SOLUTION INTRAMUSCULAR; INTRAVENOUS
Status: DISCONTINUED | OUTPATIENT
Start: 2019-10-07 | End: 2019-10-07 | Stop reason: HOSPADM

## 2019-10-07 RX ADMIN — HEPARIN SODIUM 2 ML: 10000 INJECTION, SOLUTION INTRAVENOUS; SUBCUTANEOUS at 11:14

## 2019-10-07 RX ADMIN — MIDAZOLAM 2 MG: 1 INJECTION INTRAMUSCULAR; INTRAVENOUS at 11:17

## 2019-10-07 RX ADMIN — LIDOCAINE HYDROCHLORIDE 3 ML: 10 INJECTION, SOLUTION INFILTRATION; PERINEURAL at 11:12

## 2019-10-07 RX ADMIN — IOPAMIDOL 90 ML: 755 INJECTION, SOLUTION INTRAVENOUS at 11:36

## 2019-10-07 RX ADMIN — ONDANSETRON 4 MG: 2 INJECTION INTRAMUSCULAR; INTRAVENOUS at 11:07

## 2019-10-07 RX ADMIN — SODIUM CHLORIDE 75 ML/HR: 900 INJECTION, SOLUTION INTRAVENOUS at 09:33

## 2019-10-07 RX ADMIN — HEPARIN SODIUM 3 ML/HR: 200 INJECTION, SOLUTION INTRAVENOUS at 11:13

## 2019-10-07 NOTE — PROGRESS NOTES
Patient received to [address] [place] [address] room # 7  Ambulatory from [facility]. Patient scheduled for [facility] today with Dr Trinity Martinez. Procedure reviewed & questions answered, voiced good understanding consent obtained & placed on chart. All medications and medical history reviewed. Will prep patient per orders. Patient & family updated on plan of care. The patient has a fraility score of 4-VULNERABLE, based on ability to perform ADLs with use of walker at times.

## 2019-10-07 NOTE — DISCHARGE INSTRUCTIONS
HEART CATHETERIZATION/ANGIOGRAPHY DISCHARGE INSTRUCTIONS    1. Check puncture site frequently for swelling or bleeding. If there is any bleeding, lie down and apply pressure over the area with a clean towel or washcloth. Call 911. Notify your doctor for any redness, swelling, drainage, or oozing from the puncture site. Notify your doctor for any fever or chills. 2. If the extremity becomes cold, numb, or painful call [facility] at [phone].  3. Activity should be limited for the next 48 hours. Climb stairs as little as possible and avoid any stooping, bending, or strenuous activity for 48 hours. No heavy lifting (anything over 10 pounds) for 3 days. 4. You may resume your usual diet. Drink more fluids than usual.  5. Have a responsible person drive you home and stay with you for at least 24 hours after your heart catheterization/angiography. Do not drive for the next 24 hours. 6. You may remove bandage from your Right wrist in 24 hours. You may shower in 24 hours. No tub baths, hot tubs, or swimming for 1 week. Do not place any lotions, creams, powders, or ointments over puncture site for 1 week. You may place a clean band-aid over the puncture site each day for 5 days. Change daily. 7. Please continue your medications as prescribed by your physician. I have read the above instructions and have had the opportunity to ask questions.       Patient: ________________________   Date: 10/7/2019    Witness: _______________________   Date: 10/7/2019

## 2019-10-07 NOTE — PROCEDURES
Brief Cardiac Procedure Note    Patient: Magen Flores MRN: [id number]  SSN: [id number]    YOB: 1940  Age: 78 y.o. Sex: female      Date of Procedure: 10/7/2019     Pre-procedure Diagnosis: Atypical Angina    Post-procedure Diagnosis: Non-cardiac Chest Pain    Procedure: Left Heart Catheterization    Brief Description of Procedure: See note    Performed By: Jose Rodney MD     Assistants: None    Anesthesia: Moderate Sedation    Estimated Blood Loss: Less than 10 mL      Specimens: None    Implants: None    Findings:   LV:  EF 75%  LM:  NML  LAD:  20-30% prox and mid  RI :  NML  LCx:  NML  RCA:  NML    Complications: None    Recommendations: Continue medical therapy.     Signed By: Jose Rodney MD     October 7, 2019

## 2019-10-07 NOTE — PROGRESS NOTES
TRANSFER - OUT REPORT:    Verbal report given to Hipolito Gatica RN(name) on Xavi Gordon  being transferred to cpru(unit) for routine progression of care       Report consisted of patients Situation, Background, Assessment and   Recommendations(SBAR). Information from the following report(s) SBAR was reviewed with the receiving nurse. is allergic to codeine; other medication; and phenergan [promethazine]. Opportunity for questions and clarification was provided. Procedure Summary:Pt had LHC via R  Wrist, site sealed with R band using 8 ml at 1135 hrs. Med Administration    Versed:  2 mg  Zofran: 4 mg  Visit Vitals  /62 (BP 1 Location: Left arm, BP Patient Position: Supine)   Pulse (!) 53   Resp 14   Ht 5' 8\" (1.727 m)   Wt 88 kg (194 lb)   SpO2 96%   Breastfeeding?  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

## 2019-10-07 NOTE — PROGRESS NOTES
TRANSFER - IN REPORT:    Verbal report received from [facility] RN(name) on Rach Ohms  being received from cath lab(unit) for routine progression of care      Report consisted of patients Situation, Background, Assessment and   Recommendations(SBAR). Information from the following report(s) Procedure Summary was reviewed with the receiving nurse. Opportunity for questions and clarification was provided. Assessment completed upon patients arrival to unit and care assumed.

## 2019-10-07 NOTE — PROCEDURES
[facility]  CARDIAC CATH    Name:  Payal King  MR#:  [id number]  :  1940  ACCOUNT #:  [de-identified]  DATE OF SERVICE:  10/07/2019    PRIMARY CARDIOLOGIST:  Ike Kebede. Lee Ann Joya MD    PRIMARY CARE PHYSICIAN:  Kaleb Hackett. Tom Knutson MD    BRIEF HISTORY:  The patient is a 77-year-old female with history shortness of breath, fatigue, chest pain, underwent echocardiogram suggesting potentially mediastinal mass compressing heart. CT demonstrate no evidence of mediastinal mass, but did suggest coronary calcium. Given the patient's symptoms of chest pain and shortness breath, referred for cardiac catheterization. PREOPERATIVE DIAGNOSES:  Chest pain and shortness breath. POSTOPERATIVE DIAGNOSIS:  Noncardiac chest pain. PROCEDURES PERFORMED:  Bilateral selective coronary angiography, left ventriculography. SURGEON:  Grupo Martinez MD    ASSISTANT:  None. COMPLICATIONS:  None. ANESTHESIA:  Conscious sedation. Start time 11:17, end time 11:34. MEDICATIONS:  2 mg of Versed. MONITORING RN:  Scout Miller. SPECIMENS:  None. IMPLANTS:  None. ESTIMATED BLOOD LOSS:  Less than 5 mL. PROCEDURE:  After informed consent, she was prepped and draped in the usual sterile fashion. Right wrist was infiltrated with lidocaine. Right radial artery was accessed. A 6-Serbian sheath was advanced. The patient has very tortuous right brachiocephalic. A FreeLunched wire was utilized to navigate tortuosity. A 5-Serbian JL3.5 was utilized for left coronary injection. A 5-Serbian Tiger catheter was utilized for right coronary injection. A 5-Serbian angled pigtail for left ventriculography. CONTRAST:  Isovue. FINDINGS:  1. Left ventricle:  Normal left ventricular size with moderate to severe concentric hypertrophy. There is hyperdynamic left ventricular systolic function. Ejection fraction greater than 75%.   Left end-diastolic pressure is measured at 3 mmHg,suggested dehydration. There is no aortic valve gradient. There is no mitral regurgitation. 2.  Left main:  Left main is moderate in size, bifurcates into LAD and circumflex systems. Appears angiographically normal.  3.  Left anterior descending coronary:  There is mild to moderate proximal and mid calcifications associated with 20-30% prox and mid stenosis. A small mid vessel diagonal which is normal.  The LAD distally is quite large as it courses around the apex disease-free. 4.  Ramus intermedius: It is a very large vessel, bifurcates into two large branches that supply the entire lateral wall. These appear angiographically normal.  5.  Left circumflex coronary: It is a small AV circumflex branch, appears angiographically normal.  6.  Right coronary: It is a moderate-sized anatomically dominant vessel, gives rise to a small to moderate-sized posterior descending and appears angiographically normal.    Successful hemostasis with pneumatic radial band. CONCLUSIONS:  1. Hyperdynamic left ventricular systolic function with low end-diastolic pressure suggesting dehydration. 2.  Likely moderate to severe concentric left ventricular hypertrophy due to poorly controlled hypertension. 3.  Mild coronary calcification with mild to moderate nonobstructive LAD disease and normal-appearing ramus, circumflex and right coronary arteries. Thank you for allowing us to participate in the care of this patient. Any questions or concerns, please feel free to contact me. Maureen Gong MD      [id number]  D:  10/07/2019 11:43  T:  10/07/2019 13:03  JOB #:  [id number]  CC:  Sofía Sands MD       [facility].  Glenn Perez MD

## 2021-08-03 PROBLEM — I10 ESSENTIAL HYPERTENSION: Status: RESOLVED | Noted: 2019-08-14 | Resolved: 2021-08-03

## (undated) DEVICE — DRAPE,TOP,102X53,STERILE: Brand: MEDLINE

## (undated) DEVICE — MEDI-VAC YANKAUER SUCTION HANDLE W/BULBOUS TIP: Brand: CARDINAL HEALTH

## (undated) DEVICE — PACK PROCEDURE SURG TOT KNEE

## (undated) DEVICE — SYR 50ML LR LCK 1ML GRAD NSAF --

## (undated) DEVICE — BLADE SAW W12.5XL73.5MM THK0.8MM CUT THK1MM RECIP FOR L BNE

## (undated) DEVICE — PRECISION FALCON OSCILLATING TIP SAW CARTRIDGE: Brand: PRECISION FALCON

## (undated) DEVICE — AMD ANTIMICROBIAL NON-ADHERENT PAD,0.2% POLYHEXAMETHYLENE BIGUANIDE HCI (PHMB): Brand: TELFA

## (undated) DEVICE — 2000CC GUARDIAN II: Brand: GUARDIAN

## (undated) DEVICE — BUTTON SWITCH PENCIL BLADE ELECTRODE, HOLSTER: Brand: EDGE

## (undated) DEVICE — SUTURE ABSRB X-1 REV CUT 1/2 CIR 22MM UD BRAID 27IN SZ 3-0 J458H

## (undated) DEVICE — SYSTEM CULT COLL OR TRNSPRT CLR DBL SWAB W/ MOD AERB AMIES

## (undated) DEVICE — TRAY PREP DRY W/ PREM GLV 2 APPL 6 SPNG 2 UNDPD 1 OVERWRAP

## (undated) DEVICE — SKIN STAPLER: Brand: SIGNET

## (undated) DEVICE — SOLUTION IRRIG 3000ML 0.9% SOD CHL FLX CONT 0797208] ICU MEDICAL INC]

## (undated) DEVICE — BANDAGE COMPR SELF ADH 5 YDX4 IN TAN STRL PREMIERPRO LF

## (undated) DEVICE — DISPOSABLE DRAPE, STERILE, FOR A CDS-3060 5 FOOT TABLE: Brand: PEDIGO PRODUCTS, INC.

## (undated) DEVICE — (D)PREP SKN CHLRAPRP APPL 26ML -- CONVERT TO ITEM 371833

## (undated) DEVICE — SYR 10ML LUER LOK 1/5ML GRAD --

## (undated) DEVICE — Device: Brand: STABLECUT®

## (undated) DEVICE — SUTURE STRATAFIX SYMMETRIC PDS + SZ 2-0 L18IN ABSRB VLT SXPP1A403

## (undated) DEVICE — 3000CC GUARDIAN II: Brand: GUARDIAN

## (undated) DEVICE — SYR LR LCK 1ML GRAD NSAF 30ML --

## (undated) DEVICE — CURETTE BNE CEM 10IN DISP --

## (undated) DEVICE — SUTURE PDS II SZ 1 L96IN ABSRB VLT TP-1 L65MM 1/2 CIR Z880G

## (undated) DEVICE — Z DISCONTINUED USE 2744636  DRESSING AQUACEL 14 IN ALG W3.5XL14IN POLYUR FLM CVR W/ HYDRCOLL

## (undated) DEVICE — CONTAINER,SPECIMEN,O.R.STRL,4.5OZ: Brand: MEDLINE

## (undated) DEVICE — SUT ETHBND 2 30IN LR DA GRN --

## (undated) DEVICE — DISPOSABLE TOURNIQUET CUFF SINGLE BLADDER, DUAL PORT AND LUER LOCK CONNECTOR: Brand: COLOR CUFF

## (undated) DEVICE — DRAIN KT WND 10FR RND 400ML --

## (undated) DEVICE — NEEDLE HYPO 18GA L1.5IN PNK S STL HUB POLYPR SHLD REG BVL

## (undated) DEVICE — X-LARGE COTTON GLOVE: Brand: DEROYAL

## (undated) DEVICE — BIPOLAR SEALER 23-112-1 AQM 6.0: Brand: AQUAMANTYS ®

## (undated) DEVICE — 3M™ STERI-DRAPE™ INSTRUMENT POUCH 1018: Brand: STERI-DRAPE™

## (undated) DEVICE — TETRA-FLEX CF WOVEN LATEX FREE ELASTIC BANDAGE 6" X 11 YD: Brand: TETRA-FLEX™CF

## (undated) DEVICE — TRAY CATH 16F DRN BG LTX -- CONVERT TO ITEM 363158

## (undated) DEVICE — GOWN,REINF,POLY,ECL,PP SLV,XL: Brand: MEDLINE

## (undated) DEVICE — T4 HOOD

## (undated) DEVICE — SUTURE VCRL SZ 1 L27IN ABSRB UD L36MM CP-1 1/2 CIR REV CUT J268H

## (undated) DEVICE — SOLUTION IV DEXTROSE/SALINE 5%-0.9% 500ML - 500ML

## (undated) DEVICE — REM POLYHESIVE ADULT PATIENT RETURN ELECTRODE: Brand: VALLEYLAB

## (undated) DEVICE — SOLUTION IV 1000ML 0.9% SOD CHL

## (undated) DEVICE — TIBURON EXTREMITY SHEET: Brand: CONVERTORS